# Patient Record
Sex: FEMALE | Race: OTHER | Employment: UNEMPLOYED | URBAN - METROPOLITAN AREA
[De-identification: names, ages, dates, MRNs, and addresses within clinical notes are randomized per-mention and may not be internally consistent; named-entity substitution may affect disease eponyms.]

---

## 2022-02-09 ENCOUNTER — TELEPHONE (OUTPATIENT)
Dept: OBGYN CLINIC | Facility: CLINIC | Age: 37
End: 2022-02-09

## 2022-02-09 NOTE — TELEPHONE ENCOUNTER
Patient calling to initiate prenatal care  LMP 1/17  Patient is 7-8 weeks  Confirmation Ultrasound and Appointment scheduled on   Future Appointments   Date Time Provider Katie Tang   3/16/2022  8:15 AM [facility] OB US ANN [facility] [place]   3/16/2022  8:45 AM Raven Muller MD [facility] N [facility] [place] PPO  Good time to return phone call anytime    Patient had a miscarriage in Sep.      Will have records faxed

## 2022-02-09 NOTE — TELEPHONE ENCOUNTER
Received medical records from [facility]. Placed in Dr Villar's bin in [place] for review.   Future Appointments   Date Time Provider Katie Tang   3/16/2022  8:15 AM [facility] OB US ANN [facility] [place]   3/16/2022  8:45 AM Ilia Leal MD [facility] [place]

## 2022-02-09 NOTE — TELEPHONE ENCOUNTER
LMP: 22- regular cycles, 26 days  Hx of SAB- twins    No concerns with this pregnancy. Nervous. Bleeding/ER precautions given and patient states understanding.

## 2022-02-15 NOTE — TELEPHONE ENCOUNTER
Medical records reviewed:    G1 20  at 39w4d, Male, 6lb 12oz  G2 MAB of mono/mono twins at 7wks    H/o abnormal pap - last pap 20  Ovarian torsion 21    Laparoscopy (presumed for ovarian torsion )  Suction D&C 21 - MAB of twins    Former smoker    Flu vaccine 21  COVID vaccines 3/1/21, 21  HPV vaccine

## 2022-02-26 ENCOUNTER — MED REC SCAN ONLY (OUTPATIENT)
Dept: OBGYN CLINIC | Facility: CLINIC | Age: 37
End: 2022-02-26

## 2022-03-07 ENCOUNTER — HOSPITAL ENCOUNTER (OUTPATIENT)
Facility: HOSPITAL | Age: 37
Setting detail: OBSERVATION
Discharge: HOME | End: 2022-03-08
Attending: EMERGENCY MEDICINE | Admitting: HOSPITALIST
Payer: COMMERCIAL

## 2022-03-07 ENCOUNTER — APPOINTMENT (OUTPATIENT)
Dept: ULTRASOUND IMAGING | Facility: HOSPITAL | Age: 37
End: 2022-03-07
Attending: EMERGENCY MEDICINE
Payer: COMMERCIAL

## 2022-03-07 DIAGNOSIS — R10.9 ABDOMINAL PAIN OF UNKNOWN ETIOLOGY: Primary | ICD-10-CM

## 2022-03-07 DIAGNOSIS — R11.2 NAUSEA AND VOMITING, UNSPECIFIED VOMITING TYPE: ICD-10-CM

## 2022-03-07 PROBLEM — R73.9 HYPERGLYCEMIA: Status: ACTIVE | Noted: 2022-03-07

## 2022-03-07 PROBLEM — E87.1 HYPONATREMIA: Status: ACTIVE | Noted: 2022-03-07

## 2022-03-07 PROBLEM — R79.89 AZOTEMIA: Status: ACTIVE | Noted: 2022-03-07

## 2022-03-07 PROBLEM — E87.6 HYPOKALEMIA: Status: ACTIVE | Noted: 2022-03-07

## 2022-03-07 LAB
ALBUMIN SERPL-MCNC: 4 G/DL (ref 3.4–5)
ALBUMIN/GLOB SERPL: 1.1 {RATIO} (ref 1–2)
ALP LIVER SERPL-CCNC: 60 U/L
ALT SERPL-CCNC: 20 U/L
ANION GAP SERPL CALC-SCNC: 6 MMOL/L (ref 0–18)
AST SERPL-CCNC: 9 U/L (ref 15–37)
B-HCG SERPL-ACNC: ABNORMAL MIU/ML
BASOPHILS # BLD AUTO: 0.04 X10(3) UL (ref 0–0.2)
BASOPHILS NFR BLD AUTO: 0.7 %
BILIRUB SERPL-MCNC: 0.4 MG/DL (ref 0.1–2)
BILIRUB UR QL STRIP.AUTO: NEGATIVE
BUN BLD-MCNC: 15 MG/DL (ref 7–18)
CHLORIDE SERPL-SCNC: 104 MMOL/L (ref 98–112)
CO2 SERPL-SCNC: 25 MMOL/L (ref 21–32)
COLOR UR AUTO: YELLOW
CREAT BLD-MCNC: 0.68 MG/DL
EOSINOPHIL # BLD AUTO: 0.08 X10(3) UL (ref 0–0.7)
EOSINOPHIL NFR BLD AUTO: 1.3 %
ERYTHROCYTE [DISTWIDTH] IN BLOOD BY AUTOMATED COUNT: 11.2 %
GLOBULIN PLAS-MCNC: 3.5 G/DL (ref 2.8–4.4)
GLUCOSE BLD-MCNC: 102 MG/DL (ref 70–99)
GLUCOSE UR STRIP.AUTO-MCNC: NEGATIVE MG/DL
HCT VFR BLD AUTO: 36.9 %
HGB BLD-MCNC: 13.3 G/DL
IMM GRANULOCYTES # BLD AUTO: 0.02 X10(3) UL (ref 0–1)
IMM GRANULOCYTES NFR BLD: 0.3 %
KETONES UR STRIP.AUTO-MCNC: 80 MG/DL
LEUKOCYTE ESTERASE UR QL STRIP.AUTO: NEGATIVE
LIPASE SERPL-CCNC: 66 U/L (ref 73–393)
LYMPHOCYTES # BLD AUTO: 2.17 X10(3) UL (ref 1–4)
LYMPHOCYTES NFR BLD AUTO: 36.1 %
MCH RBC QN AUTO: 31.7 PG (ref 26–34)
MCHC RBC AUTO-ENTMCNC: 36 G/DL (ref 31–37)
MCV RBC AUTO: 88.1 FL
MONOCYTES # BLD AUTO: 0.35 X10(3) UL (ref 0.1–1)
MONOCYTES NFR BLD AUTO: 5.8 %
NEUTROPHILS # BLD AUTO: 3.35 X10(3) UL (ref 1.5–7.7)
NEUTROPHILS NFR BLD AUTO: 55.8 %
NITRITE UR QL STRIP.AUTO: NEGATIVE
OSMOLALITY SERPL CALC.SUM OF ELEC: 281 MOSM/KG (ref 275–295)
PH UR STRIP.AUTO: 7 [PH] (ref 5–8)
PLATELET # BLD AUTO: 243 10(3)UL (ref 150–450)
POTASSIUM SERPL-SCNC: 3.3 MMOL/L (ref 3.5–5.1)
PROT SERPL-MCNC: 7.5 G/DL (ref 6.4–8.2)
PROT UR STRIP.AUTO-MCNC: 30 MG/DL
RBC # BLD AUTO: 4.19 X10(6)UL
RBC UR QL AUTO: NEGATIVE
RH BLOOD TYPE: POSITIVE
SARS-COV-2 RNA RESP QL NAA+PROBE: NOT DETECTED
SODIUM SERPL-SCNC: 135 MMOL/L (ref 136–145)
SP GR UR STRIP.AUTO: 1.03 (ref 1–1.03)
UROBILINOGEN UR STRIP.AUTO-MCNC: <2 MG/DL
WBC # BLD AUTO: 6 X10(3) UL (ref 4–11)

## 2022-03-07 PROCEDURE — 76856 US EXAM PELVIC COMPLETE: CPT | Performed by: EMERGENCY MEDICINE

## 2022-03-07 PROCEDURE — 76700 US EXAM ABDOM COMPLETE: CPT | Performed by: EMERGENCY MEDICINE

## 2022-03-07 PROCEDURE — 93975 VASCULAR STUDY: CPT | Performed by: EMERGENCY MEDICINE

## 2022-03-07 PROCEDURE — 76830 TRANSVAGINAL US NON-OB: CPT | Performed by: EMERGENCY MEDICINE

## 2022-03-07 RX ORDER — SODIUM CHLORIDE 9 MG/ML
INJECTION, SOLUTION INTRAVENOUS CONTINUOUS
Status: CANCELLED | OUTPATIENT
Start: 2022-03-07 | End: 2022-03-08

## 2022-03-07 RX ORDER — HYDROMORPHONE HYDROCHLORIDE 1 MG/ML
INJECTION, SOLUTION INTRAMUSCULAR; INTRAVENOUS; SUBCUTANEOUS
Status: COMPLETED
Start: 2022-03-07 | End: 2022-03-07

## 2022-03-07 RX ORDER — HYDROMORPHONE HYDROCHLORIDE 1 MG/ML
0.5 INJECTION, SOLUTION INTRAMUSCULAR; INTRAVENOUS; SUBCUTANEOUS EVERY 30 MIN PRN
Status: DISCONTINUED | OUTPATIENT
Start: 2022-03-07 | End: 2022-03-08

## 2022-03-07 RX ORDER — METOCLOPRAMIDE HYDROCHLORIDE 5 MG/ML
5 INJECTION INTRAMUSCULAR; INTRAVENOUS ONCE
Status: COMPLETED | OUTPATIENT
Start: 2022-03-07 | End: 2022-03-07

## 2022-03-07 RX ORDER — ONDANSETRON 2 MG/ML
4 INJECTION INTRAMUSCULAR; INTRAVENOUS EVERY 4 HOURS PRN
Status: CANCELLED | OUTPATIENT
Start: 2022-03-07 | End: 2022-03-08

## 2022-03-07 RX ORDER — SODIUM CHLORIDE 9 MG/ML
INJECTION, SOLUTION INTRAVENOUS CONTINUOUS
Status: DISCONTINUED | OUTPATIENT
Start: 2022-03-07 | End: 2022-03-08

## 2022-03-07 NOTE — ED INITIAL ASSESSMENT (HPI)
PT 7 weeks pregnant, c/o R lower back pain. PT describes it as sharp, not cramping. Denies vaginal bleeding. PT has hx of ovarian torsion and reports pain is similar.

## 2022-03-08 VITALS
SYSTOLIC BLOOD PRESSURE: 100 MMHG | HEART RATE: 69 BPM | RESPIRATION RATE: 18 BRPM | OXYGEN SATURATION: 100 % | TEMPERATURE: 98 F | HEIGHT: 65 IN | DIASTOLIC BLOOD PRESSURE: 58 MMHG | BODY MASS INDEX: 23.32 KG/M2 | WEIGHT: 140 LBS

## 2022-03-08 PROBLEM — R11.2 NAUSEA AND VOMITING, UNSPECIFIED VOMITING TYPE: Status: ACTIVE | Noted: 2022-03-08

## 2022-03-08 PROCEDURE — 99220 INITIAL OBSERVATION CARE,LEVL III: CPT | Performed by: HOSPITALIST

## 2022-03-08 RX ORDER — SODIUM CHLORIDE 9 MG/ML
INJECTION, SOLUTION INTRAVENOUS CONTINUOUS
Status: DISCONTINUED | OUTPATIENT
Start: 2022-03-08 | End: 2022-03-08

## 2022-03-08 RX ORDER — ACETAMINOPHEN 325 MG/1
650 TABLET ORAL EVERY 6 HOURS PRN
Status: DISCONTINUED | OUTPATIENT
Start: 2022-03-08 | End: 2022-03-08

## 2022-03-08 RX ORDER — GLYCERIN/MINERAL OIL
LOTION (ML) TOPICAL
COMMUNITY

## 2022-03-08 RX ORDER — HYDROMORPHONE HYDROCHLORIDE 1 MG/ML
0.8 INJECTION, SOLUTION INTRAMUSCULAR; INTRAVENOUS; SUBCUTANEOUS EVERY 2 HOUR PRN
Status: DISCONTINUED | OUTPATIENT
Start: 2022-03-08 | End: 2022-03-08

## 2022-03-08 RX ORDER — ONDANSETRON 2 MG/ML
4 INJECTION INTRAMUSCULAR; INTRAVENOUS EVERY 6 HOURS PRN
Status: DISCONTINUED | OUTPATIENT
Start: 2022-03-08 | End: 2022-03-08

## 2022-03-08 RX ORDER — HYDROMORPHONE HYDROCHLORIDE 1 MG/ML
0.4 INJECTION, SOLUTION INTRAMUSCULAR; INTRAVENOUS; SUBCUTANEOUS EVERY 2 HOUR PRN
Status: DISCONTINUED | OUTPATIENT
Start: 2022-03-08 | End: 2022-03-08

## 2022-03-08 RX ORDER — PROCHLORPERAZINE EDISYLATE 5 MG/ML
5 INJECTION INTRAMUSCULAR; INTRAVENOUS EVERY 8 HOURS PRN
Status: DISCONTINUED | OUTPATIENT
Start: 2022-03-08 | End: 2022-03-08

## 2022-03-08 RX ORDER — HYDROMORPHONE HYDROCHLORIDE 1 MG/ML
0.2 INJECTION, SOLUTION INTRAMUSCULAR; INTRAVENOUS; SUBCUTANEOUS EVERY 2 HOUR PRN
Status: DISCONTINUED | OUTPATIENT
Start: 2022-03-08 | End: 2022-03-08

## 2022-03-08 NOTE — PROGRESS NOTES
Patient has IV fluids infusing, on room air, voiding well, having intermittent nausea that accompanies pain, ambulates independently, tolerating a regular diet with a poor appetite. Patient's pain has greatly improved after Henrene Rides did a pelvic exam and manually lifted uterus. Possible discharge home tonight pending patient's pain. Plan of care discussed with patient.

## 2022-03-08 NOTE — PROGRESS NOTES
5403: Paged Orquidea Sosa to notify of new consult. He will see patient later. 1818: Paged Ryan Harrell (on call for Reena Desir) to inform him of patient's pain being 3/10 with no Dilaudid since 1230 and she was told by Reena Desir if pain had improved she could be discharged home tonight. Reena Desir stated patient can be discharged home. 1845: paged  to inquire about discharge orders.  Orders placed by .

## 2022-03-08 NOTE — PLAN OF CARE
Problem: Patient/Family Goals  Goal: Patient/Family Long Term Goal  Description: Patient's Long Term Goal: discharge home    Interventions:  - manage pain  - IVF  - See additional Care Plan goals for specific interventions  Outcome: Progressing  Goal: Patient/Family Short Term Goal  Description: Patient's Short Term Goal: pain management    Interventions:   - PRN pain meds  - PRN antiemetics  - See additional Care Plan goals for specific interventions  Outcome: Progressing     Problem: PAIN - ADULT  Goal: Verbalizes/displays adequate comfort level or patient's stated pain goal  Description: INTERVENTIONS:  - Encourage pt to monitor pain and request assistance  - Assess pain using appropriate pain scale  - Administer analgesics based on type and severity of pain and evaluate response  - Implement non-pharmacological measures as appropriate and evaluate response  - Consider cultural and social influences on pain and pain management  - Manage/alleviate anxiety  - Utilize distraction and/or relaxation techniques  - Monitor for opioid side effects  - Notify MD/LIP if interventions unsuccessful or patient reports new pain  - Anticipate increased pain with activity and pre-medicate as appropriate  Outcome: Progressing     Problem: SAFETY ADULT - FALL  Goal: Free from fall injury  Description: INTERVENTIONS:  - Assess pt frequently for physical needs  - Identify cognitive and physical deficits and behaviors that affect risk of falls.   - [place] fall precautions as indicated by assessment.  - Educate pt/family on patient safety including physical limitations  - Instruct pt to call for assistance with activity based on assessment  - Modify environment to reduce risk of injury  - Provide assistive devices as appropriate  - Consider OT/PT consult to assist with strengthening/mobility  - Encourage toileting schedule  Outcome: Progressing     Problem: DISCHARGE PLANNING  Goal: Discharge to home or other facility with appropriate resources  Description: INTERVENTIONS:  - Identify barriers to discharge w/pt and caregiver  - Include patient/family/discharge partner in discharge planning  - Arrange for needed discharge resources and transportation as appropriate  - Identify discharge learning needs (meds, wound care, etc)  - Arrange for interpreters to assist at discharge as needed  - Consider post-discharge preferences of patient/family/discharge partner  - Complete POLST form as appropriate  - Assess patient's ability to be responsible for managing their own health  - Refer to Case Management Department for coordinating discharge planning if the patient needs post-hospital services based on physician/LIP order or complex needs related to functional status, cognitive ability or social support system  Outcome: Progressing

## 2022-03-08 NOTE — PLAN OF CARE
A&Ox4. VSS. RA. . Resp: Clear bilaterally  GI: Abdomen soft, slightly rounded. Active bowel sounds, pt not passing gas. States feeling very constipated. : Voids. Pain controlled with PRN pain medications  Up ad jocy  Diet: regular  Denies nausea. IVF running per order. All appropriate safety measures in place. All questions and concerns addressed. Will continue to monitor     0300: Pt in severe pain that has moved to the [facility]. Problem: Patient/Family Goals  Goal: Patient/Family Long Term Goal  Description: Patient's Long Term Goal: discharge home    Interventions:  - manage pain  - IVF  - See additional Care Plan goals for specific interventions  Outcome: Progressing  Goal: Patient/Family Short Term Goal  Description: Patient's Short Term Goal: pain management    Interventions:   - PRN pain meds  - PRN antiemetics  - See additional Care Plan goals for specific interventions  Outcome: Progressing     Problem: PAIN - ADULT  Goal: Verbalizes/displays adequate comfort level or patient's stated pain goal  Description: INTERVENTIONS:  - Encourage pt to monitor pain and request assistance  - Assess pain using appropriate pain scale  - Administer analgesics based on type and severity of pain and evaluate response  - Implement non-pharmacological measures as appropriate and evaluate response  - Consider cultural and social influences on pain and pain management  - Manage/alleviate anxiety  - Utilize distraction and/or relaxation techniques  - Monitor for opioid side effects  - Notify MD/LIP if interventions unsuccessful or patient reports new pain  - Anticipate increased pain with activity and pre-medicate as appropriate  Outcome: Progressing     Problem: SAFETY ADULT - FALL  Goal: Free from fall injury  Description: INTERVENTIONS:  - Assess pt frequently for physical needs  - Identify cognitive and physical deficits and behaviors that affect risk of falls.   - [place] fall precautions as indicated by assessment.  - Educate pt/family on patient safety including physical limitations  - Instruct pt to call for assistance with activity based on assessment  - Modify environment to reduce risk of injury  - Provide assistive devices as appropriate  - Consider OT/PT consult to assist with strengthening/mobility  - Encourage toileting schedule  Outcome: Progressing     Problem: DISCHARGE PLANNING  Goal: Discharge to home or other facility with appropriate resources  Description: INTERVENTIONS:  - Identify barriers to discharge w/pt and caregiver  - Include patient/family/discharge partner in discharge planning  - Arrange for needed discharge resources and transportation as appropriate  - Identify discharge learning needs (meds, wound care, etc)  - Arrange for interpreters to assist at discharge as needed  - Consider post-discharge preferences of patient/family/discharge partner  - Complete POLST form as appropriate  - Assess patient's ability to be responsible for managing their own health  - Refer to Case Management Department for coordinating discharge planning if the patient needs post-hospital services based on physician/LIP order or complex needs related to functional status, cognitive ability or social support system  Outcome: Progressing

## 2022-03-09 NOTE — PROGRESS NOTES
NURSING DISCHARGE NOTE    Discharged Home via Wheelchair. Accompanied by Spouse  Belongings Taken by patient/family. Patient discharged home in stable condition. Patient left the floor via wheelchair and was accompanied by her . Discharge instructions given and were explained in detail. No new prescriptions-instructed patient to take OTC Tylenol and if pain not tolerable to call 's office. Instructed patient to schedule a follow up appointment with Steph Bowden or his partner by Friday. Patient stated understanding of discharge instructions and had no further questions at this time. Saline lock discontinued.

## 2022-03-26 ENCOUNTER — HOSPITAL ENCOUNTER (OUTPATIENT)
Facility: HOSPITAL | Age: 37
Setting detail: OBSERVATION
Discharge: HOME | End: 2022-03-27
Attending: EMERGENCY MEDICINE | Admitting: OBSTETRICS & GYNECOLOGY
Payer: COMMERCIAL

## 2022-03-26 ENCOUNTER — APPOINTMENT (OUTPATIENT)
Dept: ULTRASOUND IMAGING | Facility: HOSPITAL | Age: 37
End: 2022-03-26
Attending: EMERGENCY MEDICINE
Payer: COMMERCIAL

## 2022-03-26 DIAGNOSIS — R10.2 RIGHT ADNEXAL TENDERNESS: ICD-10-CM

## 2022-03-26 DIAGNOSIS — N83.6 HEMATOSALPINX: Primary | ICD-10-CM

## 2022-03-26 LAB
ALBUMIN SERPL-MCNC: 3.7 G/DL (ref 3.4–5)
ALBUMIN/GLOB SERPL: 1.1 {RATIO} (ref 1–2)
ALP LIVER SERPL-CCNC: 52 U/L
ALT SERPL-CCNC: 16 U/L
ANION GAP SERPL CALC-SCNC: 5 MMOL/L (ref 0–18)
AST SERPL-CCNC: 14 U/L (ref 15–37)
B-HCG SERPL-ACNC: ABNORMAL MIU/ML
B-HCG UR QL: POSITIVE
BASOPHILS # BLD AUTO: 0.03 X10(3) UL (ref 0–0.2)
BASOPHILS NFR BLD AUTO: 0.5 %
BILIRUB SERPL-MCNC: 0.5 MG/DL (ref 0.1–2)
BILIRUB UR QL STRIP.AUTO: NEGATIVE
BUN BLD-MCNC: 10 MG/DL (ref 7–18)
CALCIUM BLD-MCNC: 8.9 MG/DL (ref 8.5–10.1)
CHLORIDE SERPL-SCNC: 106 MMOL/L (ref 98–112)
CO2 SERPL-SCNC: 25 MMOL/L (ref 21–32)
COLOR UR AUTO: YELLOW
CREAT BLD-MCNC: 0.61 MG/DL
EOSINOPHIL # BLD AUTO: 0.02 X10(3) UL (ref 0–0.7)
EOSINOPHIL NFR BLD AUTO: 0.3 %
ERYTHROCYTE [DISTWIDTH] IN BLOOD BY AUTOMATED COUNT: 11.7 %
GLOBULIN PLAS-MCNC: 3.5 G/DL (ref 2.8–4.4)
GLUCOSE BLD-MCNC: 95 MG/DL (ref 70–99)
GLUCOSE UR STRIP.AUTO-MCNC: NEGATIVE MG/DL
HCT VFR BLD AUTO: 37.4 %
HGB BLD-MCNC: 13.6 G/DL
IMM GRANULOCYTES # BLD AUTO: 0.03 X10(3) UL (ref 0–1)
IMM GRANULOCYTES NFR BLD: 0.5 %
KETONES UR STRIP.AUTO-MCNC: NEGATIVE MG/DL
LEUKOCYTE ESTERASE UR QL STRIP.AUTO: NEGATIVE
LYMPHOCYTES # BLD AUTO: 1.2 X10(3) UL (ref 1–4)
LYMPHOCYTES NFR BLD AUTO: 19.6 %
MCH RBC QN AUTO: 31.9 PG (ref 26–34)
MCHC RBC AUTO-ENTMCNC: 36.4 G/DL (ref 31–37)
MCV RBC AUTO: 87.8 FL
MONOCYTES # BLD AUTO: 0.25 X10(3) UL (ref 0.1–1)
MONOCYTES NFR BLD AUTO: 4.1 %
NEUTROPHILS # BLD AUTO: 4.6 X10 (3) UL (ref 1.5–7.7)
NEUTROPHILS # BLD AUTO: 4.6 X10(3) UL (ref 1.5–7.7)
NEUTROPHILS NFR BLD AUTO: 75 %
NITRITE UR QL STRIP.AUTO: NEGATIVE
OSMOLALITY SERPL CALC.SUM OF ELEC: 281 MOSM/KG (ref 275–295)
PH UR STRIP.AUTO: 7 [PH] (ref 5–8)
PLATELET # BLD AUTO: 220 10(3)UL (ref 150–450)
POTASSIUM SERPL-SCNC: 3.6 MMOL/L (ref 3.5–5.1)
PROT SERPL-MCNC: 7.2 G/DL (ref 6.4–8.2)
PROT UR STRIP.AUTO-MCNC: 30 MG/DL
RBC # BLD AUTO: 4.26 X10(6)UL
RBC UR QL AUTO: NEGATIVE
SARS-COV-2 RNA RESP QL NAA+PROBE: NOT DETECTED
SODIUM SERPL-SCNC: 136 MMOL/L (ref 136–145)
SP GR UR STRIP.AUTO: 1.03 (ref 1–1.03)
UROBILINOGEN UR STRIP.AUTO-MCNC: <2 MG/DL
WBC # BLD AUTO: 6.1 X10(3) UL (ref 4–11)

## 2022-03-26 PROCEDURE — 76817 TRANSVAGINAL US OBSTETRIC: CPT | Performed by: EMERGENCY MEDICINE

## 2022-03-26 PROCEDURE — 81025 URINE PREGNANCY TEST: CPT

## 2022-03-26 PROCEDURE — 96361 HYDRATE IV INFUSION ADD-ON: CPT | Performed by: EMERGENCY MEDICINE

## 2022-03-26 PROCEDURE — 85025 COMPLETE CBC W/AUTO DIFF WBC: CPT | Performed by: EMERGENCY MEDICINE

## 2022-03-26 PROCEDURE — 99285 EMERGENCY DEPT VISIT HI MDM: CPT | Performed by: EMERGENCY MEDICINE

## 2022-03-26 PROCEDURE — 96375 TX/PRO/DX INJ NEW DRUG ADDON: CPT | Performed by: EMERGENCY MEDICINE

## 2022-03-26 PROCEDURE — 81001 URINALYSIS AUTO W/SCOPE: CPT | Performed by: EMERGENCY MEDICINE

## 2022-03-26 PROCEDURE — 96374 THER/PROPH/DIAG INJ IV PUSH: CPT | Performed by: EMERGENCY MEDICINE

## 2022-03-26 PROCEDURE — 84702 CHORIONIC GONADOTROPIN TEST: CPT | Performed by: EMERGENCY MEDICINE

## 2022-03-26 PROCEDURE — 76801 OB US < 14 WKS SINGLE FETUS: CPT | Performed by: EMERGENCY MEDICINE

## 2022-03-26 PROCEDURE — 80053 COMPREHEN METABOLIC PANEL: CPT | Performed by: EMERGENCY MEDICINE

## 2022-03-26 PROCEDURE — 96361 HYDRATE IV INFUSION ADD-ON: CPT

## 2022-03-26 PROCEDURE — 76775 US EXAM ABDO BACK WALL LIM: CPT | Performed by: EMERGENCY MEDICINE

## 2022-03-26 PROCEDURE — 81001 URINALYSIS AUTO W/SCOPE: CPT

## 2022-03-26 PROCEDURE — 99285 EMERGENCY DEPT VISIT HI MDM: CPT

## 2022-03-26 RX ORDER — MORPHINE SULFATE 2 MG/ML
2 INJECTION, SOLUTION INTRAMUSCULAR; INTRAVENOUS ONCE
Status: COMPLETED | OUTPATIENT
Start: 2022-03-26 | End: 2022-03-26

## 2022-03-26 RX ORDER — ACETAMINOPHEN 500 MG
1000 TABLET ORAL ONCE
Status: COMPLETED | OUTPATIENT
Start: 2022-03-26 | End: 2022-03-26

## 2022-03-26 RX ORDER — MORPHINE SULFATE 2 MG/ML
2 INJECTION, SOLUTION INTRAMUSCULAR; INTRAVENOUS ONCE
Status: DISCONTINUED | OUTPATIENT
Start: 2022-03-26 | End: 2022-03-27

## 2022-03-26 RX ORDER — ONDANSETRON 2 MG/ML
4 INJECTION INTRAMUSCULAR; INTRAVENOUS EVERY 6 HOURS PRN
Status: DISCONTINUED | OUTPATIENT
Start: 2022-03-26 | End: 2022-03-27

## 2022-03-26 RX ORDER — DEXTROSE AND SODIUM CHLORIDE 5; .45 G/100ML; G/100ML
INJECTION, SOLUTION INTRAVENOUS CONTINUOUS
Status: DISCONTINUED | OUTPATIENT
Start: 2022-03-26 | End: 2022-03-26

## 2022-03-26 RX ORDER — SODIUM CHLORIDE 9 MG/ML
1000 INJECTION, SOLUTION INTRAVENOUS ONCE
Status: COMPLETED | OUTPATIENT
Start: 2022-03-26 | End: 2022-03-27

## 2022-03-26 RX ORDER — DEXTROSE AND SODIUM CHLORIDE 5; .45 G/100ML; G/100ML
INJECTION, SOLUTION INTRAVENOUS CONTINUOUS
Status: DISCONTINUED | OUTPATIENT
Start: 2022-03-27 | End: 2022-03-27

## 2022-03-26 RX ORDER — ONDANSETRON 2 MG/ML
4 INJECTION INTRAMUSCULAR; INTRAVENOUS ONCE
Status: COMPLETED | OUTPATIENT
Start: 2022-03-26 | End: 2022-03-26

## 2022-03-26 RX ORDER — ACETAMINOPHEN 500 MG
1000 TABLET ORAL EVERY 4 HOURS PRN
Status: DISCONTINUED | OUTPATIENT
Start: 2022-03-26 | End: 2022-03-27

## 2022-03-26 RX ORDER — DIPHENHYDRAMINE HYDROCHLORIDE 50 MG/ML
25 INJECTION INTRAMUSCULAR; INTRAVENOUS ONCE
Status: COMPLETED | OUTPATIENT
Start: 2022-03-26 | End: 2022-03-26

## 2022-03-26 RX ORDER — SODIUM CHLORIDE 9 MG/ML
INJECTION, SOLUTION INTRAVENOUS CONTINUOUS
Status: DISCONTINUED | OUTPATIENT
Start: 2022-03-26 | End: 2022-03-26

## 2022-03-26 NOTE — ED QUICK NOTES
Pt reevaluated by er physician. Pt Informed of all her test reports and plan of care.  Pt verbalizing understanding

## 2022-03-26 NOTE — ED PROVIDER NOTES
Received signout from Dr. Jazmin Duncan. 82-year-old female at 10 weeks gestation here with right pelvic pain and emesis. History of right ovarian torsion. Ultrasound showing fluid collection in the right adnexal region concerning for hematosalpinx. Upon my evaluation patient's pain has substantially improved and she has no adnexal tenderness to exam.  Discussed with OB, will admit for observation and care    US KIDNEYS (LQM=[id number])    Result Date: 3/26/2022  CONCLUSION:  No acute findings. Dictated by (CST): Meet Leija MD on 3/26/2022 at 7:47 AM     Finalized by (CST): Meet Leija MD on 3/26/2022 at 7:48 AM       US PREGNANCY LESS THAN 14 WEEKS (TRANSABDOMINAL/TRANSVAGINAL) ([id number]=[id number])    Result Date: 3/26/2022  CONCLUSION: 1. Single live intrauterine pregnancy with mean gestational age by ultrasound at 10 weeks,  2 days +/- 5 days with expected delivery date of 10/20/2022 . 2. Complex hypoechoic tubular structure adjacent to the right ovary in the right adnexa measuring 5.9 x 2.2 x 5.9 cm. This is indeterminate. This could represent hematosalpinx, adjacent bowel but pyosalpinx cannot be completely excluded. Clinical correlation and repeat evaluation on follow-up ultrasound is recommended. This region was not well identified on prior examination. 3. Small amount of free pelvic fluid.       Dictated by (CST): Meet Leija MD on 3/26/2022 at 7:34 AM     Finalized by (CST): Meet Leija MD on 3/26/2022 at 7:39 AM

## 2022-03-26 NOTE — ED QUICK NOTES
Orders for admission, patient is aware of plan and ready to go upstairs.  Any questions, please call ED RN Nrey Lima at extension [phone]    Patient Covid vaccination status: Unvaccinated     COVID Test Ordered in ED: None    COVID Suspicion at Admission: N/A    Running Infusions:  Saline 1 liiter 125cc/hr    Mental Status/LOC at time of transport:  aox4    Other pertinent information:   CIWA score: N/A   NIH score:  N/A

## 2022-03-26 NOTE — ED INITIAL ASSESSMENT (HPI)
R side lower back pain started at 0100 awoke pt from sleep. Non radiating sharp 5/10 had taken   Gas x and Tylenol at home with no improvement.    Denies any fevers or v/d  Or urinary s/s

## 2022-03-26 NOTE — ED QUICK NOTES
Spoke with OR rn, states no scheduled procedure listed for the patient, send pt to the floor assigned

## 2022-03-26 NOTE — PROGRESS NOTES
Pt admitted from ER, no plans for surgery at this time. A&O x's 4, pt is 34 weeks pregnant. Per Dr. Gely Winn, pt to stay NPO until evening and if pain is gone, may possibly start diet then. IVF as ordered. Okay to be up as tolerated. PRN tylenol for pain; if pain increases severely,  Is to be notified. Orders for repeat US tomorrow morning. Pt aware and agreeable to plan. Call light and belongings are within reach, will continue hourly and prn safety rounds.

## 2022-03-27 ENCOUNTER — APPOINTMENT (OUTPATIENT)
Dept: ULTRASOUND IMAGING | Facility: HOSPITAL | Age: 37
End: 2022-03-27
Attending: OBSTETRICS & GYNECOLOGY
Payer: COMMERCIAL

## 2022-03-27 VITALS
DIASTOLIC BLOOD PRESSURE: 51 MMHG | OXYGEN SATURATION: 99 % | TEMPERATURE: 98 F | RESPIRATION RATE: 16 BRPM | SYSTOLIC BLOOD PRESSURE: 91 MMHG | HEART RATE: 66 BPM

## 2022-03-27 PROCEDURE — 76856 US EXAM PELVIC COMPLETE: CPT | Performed by: OBSTETRICS & GYNECOLOGY

## 2022-03-27 PROCEDURE — 76830 TRANSVAGINAL US NON-OB: CPT | Performed by: OBSTETRICS & GYNECOLOGY

## 2022-03-27 PROCEDURE — 96361 HYDRATE IV INFUSION ADD-ON: CPT

## 2022-03-27 NOTE — PLAN OF CARE
Pt a&ox4. Up independently with steady gait. Voiding without difficulty. Pt reporting very mild back/pelvic pain but states it is much improved from earlier today. Declines the need for pain medications. Instructed to notify RN if pain increases. Tolerating diet tonight. Plan for NPO after midnight and repeat US in the morning. Safey precautions in place. Plan of care discussed with pt, pt verbalizes understanding.         Problem: Patient/Family Goals  Goal: Patient/Family Long Term Goal  Description: Patient's Long Term Goal: discharge home    Interventions:  - IVF as ordered  -pelvic ultrasound  -maintain comfort  - See additional Care Plan goals for specific interventions  Outcome: Progressing  Goal: Patient/Family Short Term Goal  Description: Patient's Short Term Goal: comfort    Interventions:   - tolerate diet  -tolerate activity    - See additional Care Plan goals for specific interventions  Outcome: Progressing     Problem: PAIN - ADULT  Goal: Verbalizes/displays adequate comfort level or patient's stated pain goal  Description: INTERVENTIONS:  - Encourage pt to monitor pain and request assistance  - Assess pain using appropriate pain scale  - Administer analgesics based on type and severity of pain and evaluate response  - Implement non-pharmacological measures as appropriate and evaluate response  - Consider cultural and social influences on pain and pain management  - Manage/alleviate anxiety  - Utilize distraction and/or relaxation techniques  - Monitor for opioid side effects  - Notify MD/LIP if interventions unsuccessful or patient reports new pain  - Anticipate increased pain with activity and pre-medicate as appropriate  Outcome: Progressing     Problem: RISK FOR INFECTION - ADULT  Goal: Absence of fever/infection during anticipated neutropenic period  Description: INTERVENTIONS  - Monitor WBC  - Administer growth factors as ordered  - Implement neutropenic guidelines  Outcome: Progressing Problem: SAFETY ADULT - FALL  Goal: Free from fall injury  Description: INTERVENTIONS:  - Assess pt frequently for physical needs  - Identify cognitive and physical deficits and behaviors that affect risk of falls.   - [place] fall precautions as indicated by assessment.  - Educate pt/family on patient safety including physical limitations  - Instruct pt to call for assistance with activity based on assessment  - Modify environment to reduce risk of injury  - Provide assistive devices as appropriate  - Consider OT/PT consult to assist with strengthening/mobility  - Encourage toileting schedule  Outcome: Progressing     Problem: DISCHARGE PLANNING  Goal: Discharge to home or other facility with appropriate resources  Description: INTERVENTIONS:  - Identify barriers to discharge w/pt and caregiver  - Include patient/family/discharge partner in discharge planning  - Arrange for needed discharge resources and transportation as appropriate  - Identify discharge learning needs (meds, wound care, etc)  - Arrange for interpreters to assist at discharge as needed  - Consider post-discharge preferences of patient/family/discharge partner  - Complete POLST form as appropriate  - Assess patient's ability to be responsible for managing their own health  - Refer to Case Management Department for coordinating discharge planning if the patient needs post-hospital services based on physician/LIP order or complex needs related to functional status, cognitive ability or social support system  Outcome: Progressing

## 2022-03-27 NOTE — PLAN OF CARE
Pt discharged home in stable condition after pelvic US and being seen by OB. Pt had breakfast prior to discharge, tolerated meal well, no complaints of nausea or vomiting. No complaints of pain either. Pt already has scheduled follow up appt with OB for this upcoming Tuesday. Pt aware of home medications to resume. Pt had no questions/concerns on her discharge.     Problem: Patient/Family Goals  Goal: Patient/Family Long Term Goal  Description: Patient's Long Term Goal: discharge home    Interventions:  - IVF as ordered  -pelvic ultrasound  -maintain comfort  - See additional Care Plan goals for specific interventions  Outcome: Progressing  Goal: Patient/Family Short Term Goal  Description: Patient's Short Term Goal: comfort    Interventions:   - tolerate diet  -tolerate activity    - See additional Care Plan goals for specific interventions  Outcome: Progressing     Problem: PAIN - ADULT  Goal: Verbalizes/displays adequate comfort level or patient's stated pain goal  Description: INTERVENTIONS:  - Encourage pt to monitor pain and request assistance  - Assess pain using appropriate pain scale  - Administer analgesics based on type and severity of pain and evaluate response  - Implement non-pharmacological measures as appropriate and evaluate response  - Consider cultural and social influences on pain and pain management  - Manage/alleviate anxiety  - Utilize distraction and/or relaxation techniques  - Monitor for opioid side effects  - Notify MD/LIP if interventions unsuccessful or patient reports new pain  - Anticipate increased pain with activity and pre-medicate as appropriate  Outcome: Progressing     Problem: RISK FOR INFECTION - ADULT  Goal: Absence of fever/infection during anticipated neutropenic period  Description: INTERVENTIONS  - Monitor WBC  - Administer growth factors as ordered  - Implement neutropenic guidelines  Outcome: Progressing     Problem: SAFETY ADULT - FALL  Goal: Free from fall injury  Description: INTERVENTIONS:  - Assess pt frequently for physical needs  - Identify cognitive and physical deficits and behaviors that affect risk of falls.   - [place] fall precautions as indicated by assessment.  - Educate pt/family on patient safety including physical limitations  - Instruct pt to call for assistance with activity based on assessment  - Modify environment to reduce risk of injury  - Provide assistive devices as appropriate  - Consider OT/PT consult to assist with strengthening/mobility  - Encourage toileting schedule  Outcome: Progressing     Problem: DISCHARGE PLANNING  Goal: Discharge to home or other facility with appropriate resources  Description: INTERVENTIONS:  - Identify barriers to discharge w/pt and caregiver  - Include patient/family/discharge partner in discharge planning  - Arrange for needed discharge resources and transportation as appropriate  - Identify discharge learning needs (meds, wound care, etc)  - Arrange for interpreters to assist at discharge as needed  - Consider post-discharge preferences of patient/family/discharge partner  - Complete POLST form as appropriate  - Assess patient's ability to be responsible for managing their own health  - Refer to Case Management Department for coordinating discharge planning if the patient needs post-hospital services based on physician/LIP order or complex needs related to functional status, cognitive ability or social support system  Outcome: Progressing

## 2022-03-29 PROBLEM — O09.521 AMA (ADVANCED MATERNAL AGE) MULTIGRAVIDA 35+, FIRST TRIMESTER (HCC): Status: ACTIVE | Noted: 2022-03-29

## 2022-03-29 PROBLEM — O34.511: Status: ACTIVE | Noted: 2022-03-29

## 2022-03-29 PROBLEM — O09.521 AMA (ADVANCED MATERNAL AGE) MULTIGRAVIDA 35+, FIRST TRIMESTER: Status: ACTIVE | Noted: 2022-03-29

## 2022-03-29 PROBLEM — N94.89 ADNEXAL MASS: Status: ACTIVE | Noted: 2022-03-29

## 2022-03-29 PROCEDURE — 87086 URINE CULTURE/COLONY COUNT: CPT | Performed by: OBSTETRICS & GYNECOLOGY

## 2022-03-29 PROCEDURE — 87624 HPV HI-RISK TYP POOLED RSLT: CPT | Performed by: OBSTETRICS & GYNECOLOGY

## 2022-03-29 PROCEDURE — 87591 N.GONORRHOEAE DNA AMP PROB: CPT | Performed by: OBSTETRICS & GYNECOLOGY

## 2022-03-29 PROCEDURE — 87491 CHLMYD TRACH DNA AMP PROBE: CPT | Performed by: OBSTETRICS & GYNECOLOGY

## 2022-04-02 ENCOUNTER — LAB ENCOUNTER (OUTPATIENT)
Dept: LAB | Facility: HOSPITAL | Age: 37
End: 2022-04-02
Attending: OBSTETRICS & GYNECOLOGY
Payer: COMMERCIAL

## 2022-04-02 DIAGNOSIS — O09.521 AMA (ADVANCED MATERNAL AGE) MULTIGRAVIDA 35+, FIRST TRIMESTER: ICD-10-CM

## 2022-04-02 LAB
ANTIBODY SCREEN: NEGATIVE
BASOPHILS # BLD AUTO: 0.04 X10(3) UL (ref 0–0.2)
BASOPHILS NFR BLD AUTO: 0.7 %
EOSINOPHIL # BLD AUTO: 0.08 X10(3) UL (ref 0–0.7)
EOSINOPHIL NFR BLD AUTO: 1.3 %
ERYTHROCYTE [DISTWIDTH] IN BLOOD BY AUTOMATED COUNT: 11.6 %
HBV SURFACE AG SER-ACNC: <0.1 [IU]/L
HBV SURFACE AG SERPL QL IA: NONREACTIVE
HCT VFR BLD AUTO: 37.8 %
HGB BLD-MCNC: 12.9 G/DL
IMM GRANULOCYTES # BLD AUTO: 0.04 X10(3) UL (ref 0–1)
IMM GRANULOCYTES NFR BLD: 0.7 %
LYMPHOCYTES # BLD AUTO: 2.25 X10(3) UL (ref 1–4)
LYMPHOCYTES NFR BLD AUTO: 37.6 %
MCH RBC QN AUTO: 30.7 PG (ref 26–34)
MCHC RBC AUTO-ENTMCNC: 34.1 G/DL (ref 31–37)
MCV RBC AUTO: 90 FL
MONOCYTES # BLD AUTO: 0.4 X10(3) UL (ref 0.1–1)
MONOCYTES NFR BLD AUTO: 6.7 %
NEUTROPHILS # BLD AUTO: 3.18 X10 (3) UL (ref 1.5–7.7)
NEUTROPHILS # BLD AUTO: 3.18 X10(3) UL (ref 1.5–7.7)
NEUTROPHILS NFR BLD AUTO: 53 %
PLATELET # BLD AUTO: 236 10(3)UL (ref 150–450)
RBC # BLD AUTO: 4.2 X10(6)UL
RH BLOOD TYPE: POSITIVE
RUBV IGG SER QL: POSITIVE
RUBV IGG SER-ACNC: 148.8 IU/ML (ref 10–?)
T PALLIDUM AB SER QL IA: NONREACTIVE
WBC # BLD AUTO: 6 X10(3) UL (ref 4–11)

## 2022-04-02 PROCEDURE — 87389 HIV-1 AG W/HIV-1&-2 AB AG IA: CPT

## 2022-04-02 PROCEDURE — 86762 RUBELLA ANTIBODY: CPT

## 2022-04-02 PROCEDURE — 36415 COLL VENOUS BLD VENIPUNCTURE: CPT

## 2022-04-02 PROCEDURE — 86850 RBC ANTIBODY SCREEN: CPT

## 2022-04-02 PROCEDURE — 87340 HEPATITIS B SURFACE AG IA: CPT

## 2022-04-02 PROCEDURE — 86780 TREPONEMA PALLIDUM: CPT

## 2022-04-02 PROCEDURE — 86901 BLOOD TYPING SEROLOGIC RH(D): CPT

## 2022-04-02 PROCEDURE — 86900 BLOOD TYPING SEROLOGIC ABO: CPT

## 2022-04-02 PROCEDURE — 85025 COMPLETE CBC W/AUTO DIFF WBC: CPT

## 2022-05-24 PROBLEM — R10.9 ABDOMINAL PAIN OF UNKNOWN ETIOLOGY: Status: RESOLVED | Noted: 2022-03-07 | Resolved: 2022-05-24

## 2022-05-24 PROBLEM — R10.2 RIGHT ADNEXAL TENDERNESS: Status: RESOLVED | Noted: 2022-03-26 | Resolved: 2022-05-24

## 2022-05-28 ENCOUNTER — LAB ENCOUNTER (OUTPATIENT)
Dept: LAB | Facility: HOSPITAL | Age: 37
End: 2022-05-28
Attending: OBSTETRICS & GYNECOLOGY
Payer: COMMERCIAL

## 2022-05-28 DIAGNOSIS — O09.522 AMA (ADVANCED MATERNAL AGE) MULTIGRAVIDA 35+, SECOND TRIMESTER: ICD-10-CM

## 2022-05-28 PROCEDURE — 36415 COLL VENOUS BLD VENIPUNCTURE: CPT

## 2022-05-28 PROCEDURE — 82105 ALPHA-FETOPROTEIN SERUM: CPT

## 2022-05-31 LAB
AFP SMOKING: NO
FAMILY HX NEURAL TUBE DEFECT: NO
INSULIN REQ MATERNAL DIABETES: NO
MATERNAL AGE OF DELIVERY: 35.1 YR
MOM FOR AFP: 1.57
PATIENT'S AFP: 78 NG/ML

## 2022-06-07 ENCOUNTER — HOSPITAL ENCOUNTER (OUTPATIENT)
Facility: HOSPITAL | Age: 37
Discharge: HOME | End: 2022-06-07
Attending: OBSTETRICS & GYNECOLOGY | Admitting: OBSTETRICS & GYNECOLOGY
Payer: COMMERCIAL

## 2022-06-07 VITALS — HEIGHT: 65 IN | WEIGHT: 150 LBS | BODY MASS INDEX: 24.99 KG/M2

## 2022-06-07 PROCEDURE — 99211 OFF/OP EST MAY X REQ PHY/QHP: CPT

## 2022-06-07 NOTE — PROGRESS NOTES
Discharged to home per ambulatory in stable condition with written and verbal instructions. Patient verbalizes understanding of this information.
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
## 2022-06-26 ENCOUNTER — APPOINTMENT (OUTPATIENT)
Dept: ULTRASOUND IMAGING | Facility: HOSPITAL | Age: 37
End: 2022-06-26
Attending: EMERGENCY MEDICINE
Payer: COMMERCIAL

## 2022-06-26 ENCOUNTER — HOSPITAL ENCOUNTER (EMERGENCY)
Facility: HOSPITAL | Age: 37
Discharge: HOME | End: 2022-06-26
Attending: EMERGENCY MEDICINE
Payer: COMMERCIAL

## 2022-06-26 VITALS
RESPIRATION RATE: 16 BRPM | SYSTOLIC BLOOD PRESSURE: 109 MMHG | DIASTOLIC BLOOD PRESSURE: 69 MMHG | OXYGEN SATURATION: 100 % | TEMPERATURE: 98 F | HEART RATE: 68 BPM

## 2022-06-26 DIAGNOSIS — M79.662 PAIN OF LEFT CALF: Primary | ICD-10-CM

## 2022-06-26 DIAGNOSIS — Z3A.23 23 WEEKS GESTATION OF PREGNANCY: ICD-10-CM

## 2022-06-26 PROCEDURE — 99284 EMERGENCY DEPT VISIT MOD MDM: CPT

## 2022-06-26 PROCEDURE — 93971 EXTREMITY STUDY: CPT | Performed by: EMERGENCY MEDICINE

## 2022-06-26 NOTE — ED INITIAL ASSESSMENT (HPI)
Pt to ED c/o left calf and foot pain after being on an airplane today. Pt is 23 weeks pregnant. Pt denies shortness of breath.

## 2022-07-08 ENCOUNTER — LAB ENCOUNTER (OUTPATIENT)
Dept: LAB | Facility: HOSPITAL | Age: 37
End: 2022-07-08
Payer: COMMERCIAL

## 2022-07-08 DIAGNOSIS — Z34.82 ENCOUNTER FOR SUPERVISION OF OTHER NORMAL PREGNANCY IN SECOND TRIMESTER: ICD-10-CM

## 2022-07-08 LAB
BASOPHILS # BLD AUTO: 0.04 X10(3) UL (ref 0–0.2)
BASOPHILS NFR BLD AUTO: 0.4 %
EOSINOPHIL # BLD AUTO: 0.1 X10(3) UL (ref 0–0.7)
EOSINOPHIL NFR BLD AUTO: 0.9 %
ERYTHROCYTE [DISTWIDTH] IN BLOOD BY AUTOMATED COUNT: 12.1 %
HCT VFR BLD AUTO: 36.8 %
HGB BLD-MCNC: 12.6 G/DL
IMM GRANULOCYTES # BLD AUTO: 0.15 X10(3) UL (ref 0–1)
IMM GRANULOCYTES NFR BLD: 1.4 %
LYMPHOCYTES # BLD AUTO: 1.24 X10(3) UL (ref 1–4)
LYMPHOCYTES NFR BLD AUTO: 11.8 %
MCH RBC QN AUTO: 31.8 PG (ref 26–34)
MCHC RBC AUTO-ENTMCNC: 34.2 G/DL (ref 31–37)
MCV RBC AUTO: 92.9 FL
MONOCYTES # BLD AUTO: 0.59 X10(3) UL (ref 0.1–1)
MONOCYTES NFR BLD AUTO: 5.6 %
NEUTROPHILS # BLD AUTO: 8.41 X10 (3) UL (ref 1.5–7.7)
NEUTROPHILS # BLD AUTO: 8.41 X10(3) UL (ref 1.5–7.7)
NEUTROPHILS NFR BLD AUTO: 79.9 %
PLATELET # BLD AUTO: 226 10(3)UL (ref 150–450)
RBC # BLD AUTO: 3.96 X10(6)UL
WBC # BLD AUTO: 10.5 X10(3) UL (ref 4–11)

## 2022-07-08 PROCEDURE — 85025 COMPLETE CBC W/AUTO DIFF WBC: CPT

## 2022-07-09 ENCOUNTER — LAB ENCOUNTER (OUTPATIENT)
Dept: LAB | Facility: HOSPITAL | Age: 37
End: 2022-07-09
Payer: COMMERCIAL

## 2022-07-09 PROCEDURE — 82950 GLUCOSE TEST: CPT

## 2022-07-09 PROCEDURE — 36415 COLL VENOUS BLD VENIPUNCTURE: CPT

## 2022-08-20 ENCOUNTER — LAB ENCOUNTER (OUTPATIENT)
Dept: LAB | Facility: HOSPITAL | Age: 37
End: 2022-08-20
Attending: OBSTETRICS & GYNECOLOGY
Payer: COMMERCIAL

## 2022-08-20 DIAGNOSIS — Z34.83 ENCOUNTER FOR SUPERVISION OF OTHER NORMAL PREGNANCY IN THIRD TRIMESTER: ICD-10-CM

## 2022-08-20 PROCEDURE — 87389 HIV-1 AG W/HIV-1&-2 AB AG IA: CPT

## 2022-08-20 PROCEDURE — 36415 COLL VENOUS BLD VENIPUNCTURE: CPT

## 2022-08-31 ENCOUNTER — HOSPITAL ENCOUNTER (OUTPATIENT)
Facility: HOSPITAL | Age: 37
Discharge: HOME | End: 2022-08-31
Attending: OBSTETRICS & GYNECOLOGY | Admitting: OBSTETRICS & GYNECOLOGY
Payer: COMMERCIAL

## 2022-08-31 VITALS
HEIGHT: 65 IN | TEMPERATURE: 99 F | SYSTOLIC BLOOD PRESSURE: 113 MMHG | BODY MASS INDEX: 27.66 KG/M2 | HEART RATE: 88 BPM | WEIGHT: 166 LBS | RESPIRATION RATE: 18 BRPM | DIASTOLIC BLOOD PRESSURE: 68 MMHG

## 2022-08-31 LAB
BILIRUB UR QL STRIP.AUTO: NEGATIVE
BILIRUBIN URINE: NEGATIVE
BLOOD URINE: NEGATIVE
CLARITY UR REFRACT.AUTO: CLEAR
COLOR UR AUTO: YELLOW
CONTROL RUN WITHIN 24 HOURS?: YES
GLUCOSE UR STRIP.AUTO-MCNC: NEGATIVE MG/DL
GLUCOSE URINE: NEGATIVE
KETONE URINE: NEGATIVE
KETONES UR STRIP.AUTO-MCNC: NEGATIVE MG/DL
LEUKOCYTE ESTERASE UR QL STRIP.AUTO: NEGATIVE
LEUKOCYTE ESTERASE URINE: NEGATIVE
NITRITE UR QL STRIP.AUTO: NEGATIVE
NITRITE URINE: NEGATIVE
PH UR STRIP.AUTO: 7 [PH] (ref 5–8)
PH URINE: 7 (ref 5–8)
PROT UR STRIP.AUTO-MCNC: NEGATIVE MG/DL
PROTEIN URINE: NEGATIVE
RBC UR QL AUTO: NEGATIVE
SP GR UR STRIP.AUTO: 1.01 (ref 1–1.03)
SPEC GRAVITY: 1.01 (ref 1–1.03)
URINE CLARITY: CLEAR
URINE COLOR: YELLOW
UROBILINOGEN UR STRIP.AUTO-MCNC: 1 MG/DL
UROBILINOGEN URINE: 1

## 2022-08-31 PROCEDURE — 99213 OFFICE O/P EST LOW 20 MIN: CPT

## 2022-08-31 PROCEDURE — 59025 FETAL NON-STRESS TEST: CPT

## 2022-08-31 PROCEDURE — 96372 THER/PROPH/DIAG INJ SC/IM: CPT

## 2022-08-31 PROCEDURE — 81003 URINALYSIS AUTO W/O SCOPE: CPT | Performed by: OBSTETRICS & GYNECOLOGY

## 2022-08-31 RX ORDER — TERBUTALINE SULFATE 1 MG/ML
0.25 INJECTION, SOLUTION SUBCUTANEOUS
Status: COMPLETED | OUTPATIENT
Start: 2022-08-31 | End: 2022-08-31

## 2022-08-31 RX ORDER — TERBUTALINE SULFATE 1 MG/ML
INJECTION, SOLUTION SUBCUTANEOUS
Status: COMPLETED
Start: 2022-08-31 | End: 2022-08-31

## 2022-08-31 NOTE — PROGRESS NOTES
Discharge instructions discussed with patient. Patient verbalized understanding.  Patient not in active labor at this time

## 2022-08-31 NOTE — PROGRESS NOTES
Dr. Leona Schultz called and notified of patient arrival and chief complaints. Orders given to perform SVE, send urine for dip and culture, PO fluids and 1 dose of terbutaline. Patient agreeable to POC.

## 2022-08-31 NOTE — PROGRESS NOTES
Pt is a 31year old female admitted to TR/TRG4-A. Patient presents with:  Contractions: patient states she been having uncomfortable painful contractions that started last night. Pt is  32w2d intra-uterine pregnancy. History obtained, consents signed. Oriented to room, staff, and plan of care.

## 2022-08-31 NOTE — PROGRESS NOTES
Dr. Mira Herrera called and given update. Orders given for discharge. Patient is to make an appointment within one week. Pelvic rest to begin.

## 2022-08-31 NOTE — NST
Nonstress Test   Patient: Sulema Arthur    Gestation: 32w2d    NST:       Variability: Moderate           Accelerations: Yes           Decelerations: None            Baseline: 140 BPM           Uterine Irritability: No           Contractions: Irregular                    Contraction Frequency: X4                   Acoustic Stimulator: No           Nonstress Test Interpretation: Reactive           Nonstress Test Second Interpretation: Reactive                     Additional Comments

## 2022-09-01 ENCOUNTER — HOSPITAL ENCOUNTER (OUTPATIENT)
Facility: HOSPITAL | Age: 37
Discharge: HOME | End: 2022-09-01
Attending: OBSTETRICS & GYNECOLOGY | Admitting: OBSTETRICS & GYNECOLOGY
Payer: COMMERCIAL

## 2022-09-01 VITALS
SYSTOLIC BLOOD PRESSURE: 104 MMHG | DIASTOLIC BLOOD PRESSURE: 64 MMHG | TEMPERATURE: 98 F | HEART RATE: 81 BPM | RESPIRATION RATE: 18 BRPM

## 2022-09-01 LAB — FIBRONECTIN FETAL VAG QL: NEGATIVE

## 2022-09-01 PROCEDURE — 82731 ASSAY OF FETAL FIBRONECTIN: CPT | Performed by: OBSTETRICS & GYNECOLOGY

## 2022-09-01 PROCEDURE — 59025 FETAL NON-STRESS TEST: CPT

## 2022-09-01 PROCEDURE — 96372 THER/PROPH/DIAG INJ SC/IM: CPT

## 2022-09-01 PROCEDURE — 99213 OFFICE O/P EST LOW 20 MIN: CPT

## 2022-09-01 RX ORDER — ALBUTEROL SULFATE 90 UG/1
AEROSOL, METERED RESPIRATORY (INHALATION) EVERY 6 HOURS PRN
COMMUNITY

## 2022-09-01 RX ORDER — TERBUTALINE SULFATE 1 MG/ML
0.25 INJECTION, SOLUTION SUBCUTANEOUS ONCE
Status: COMPLETED | OUTPATIENT
Start: 2022-09-01 | End: 2022-09-01

## 2022-09-01 NOTE — NST
Nonstress Test   Patient: Evon Sport    Gestation: 32w3d    NST:       Variability: Moderate           Accelerations: Yes           Decelerations: None            Baseline: 135 BPM           Uterine Irritability: No           Contractions: Irregular                    Contraction Frequency: 2-11                   Acoustic Stimulator: No           Nonstress Test Interpretation: Reactive           Nonstress Test Second Interpretation: Reactive                     Additional Comments

## 2022-09-01 NOTE — PROGRESS NOTES
Patient discharged home with personal belongings. Written and verbal discharge orders given. All questions answered. Patient ambulated off unit, not in active labor.

## 2022-09-01 NOTE — PROGRESS NOTES
Pt is a 31year old female admitted to TR[facility]. Patient presents with:  R/o Labor: ctx q7-10 mins, 2/10 pain, active fetal movment and denies leaking of fluid and bleeding     Pt is  32w3d intra-uterine pregnancy. History obtained, consents signed. Oriented to room, staff, and plan of care.

## 2022-09-15 ENCOUNTER — HOSPITAL ENCOUNTER (OUTPATIENT)
Facility: HOSPITAL | Age: 37
Discharge: HOME | End: 2022-09-15
Attending: OBSTETRICS & GYNECOLOGY | Admitting: OBSTETRICS & GYNECOLOGY

## 2022-09-15 VITALS
SYSTOLIC BLOOD PRESSURE: 132 MMHG | RESPIRATION RATE: 16 BRPM | HEART RATE: 111 BPM | TEMPERATURE: 98 F | DIASTOLIC BLOOD PRESSURE: 84 MMHG

## 2022-09-15 PROCEDURE — 99213 OFFICE O/P EST LOW 20 MIN: CPT

## 2022-09-15 PROCEDURE — 59025 FETAL NON-STRESS TEST: CPT

## 2022-09-15 PROCEDURE — 96372 THER/PROPH/DIAG INJ SC/IM: CPT

## 2022-09-15 RX ORDER — CETIRIZINE HYDROCHLORIDE 10 MG/1
10 TABLET ORAL DAILY
COMMUNITY

## 2022-09-15 RX ORDER — TERBUTALINE SULFATE 1 MG/ML
0.25 INJECTION, SOLUTION SUBCUTANEOUS ONCE
Status: COMPLETED | OUTPATIENT
Start: 2022-09-15 | End: 2022-09-15

## 2022-09-15 NOTE — PROGRESS NOTES
Pt is a 30year old female admitted to TRG3/TRG3-A. Patient presents with:  R/o  Labor: CXs every 4-7mins starting at 11:30 this morning increasing in frequency and intensity. Denies LOF or vaginal bleeding. +FM. Pt is  34w3d intra-uterine pregnancy. History obtained, consents signed. Oriented to room, staff, and plan of care. EFM tested and applied. Abdomen soft and non-tender.

## 2022-09-16 NOTE — PROGRESS NOTES
Pt given both written and verbal discharge instructions. Pt verbalized understanding. Questions answered. Pt discharged home in stable condition.

## 2022-09-16 NOTE — NST
Nonstress Test   Patient: Maki Pacheco    Gestation: 34w3d    NST:       Variability: Moderate           Accelerations: Yes           Decelerations: None            Baseline: 140 BPM           Uterine Irritability: Yes           Contractions: Irregular                                        Acoustic Stimulator: No           Nonstress Test Interpretation: Reactive                                 Additional Comments         NST reactive  Not in labor

## 2022-09-24 ENCOUNTER — HOSPITAL ENCOUNTER (OUTPATIENT)
Facility: HOSPITAL | Age: 37
Discharge: HOME | End: 2022-09-24
Attending: OBSTETRICS & GYNECOLOGY | Admitting: OBSTETRICS & GYNECOLOGY

## 2022-09-24 VITALS
SYSTOLIC BLOOD PRESSURE: 108 MMHG | HEART RATE: 81 BPM | DIASTOLIC BLOOD PRESSURE: 60 MMHG | TEMPERATURE: 99 F | RESPIRATION RATE: 14 BRPM

## 2022-09-24 PROCEDURE — 99214 OFFICE O/P EST MOD 30 MIN: CPT

## 2022-09-24 PROCEDURE — 59025 FETAL NON-STRESS TEST: CPT

## 2022-09-24 NOTE — PROGRESS NOTES
Pt is a 30year old female admitted to TR/TR-A. Patient presents with:  R/o Rom: pt c/o leaking of fld yesterday and today, also c/o cramping     Pt is  35w5d intra-uterine pregnancy. History obtained, consents signed. Oriented to room, staff, and plan of care. EFM tested and applied. Abdomen soft and non-tender.

## 2022-09-24 NOTE — NST
Nonstress Test   Patient: Frank Hartmann    Gestation: 35w5d    NST:       Variability: Moderate           Accelerations: Yes           Decelerations: None            FHR = 130           Uterine Irritability: Yes           Contractions: Irregular                                        Acoustic Stimulator: No           Nonstress Test Interpretation: Reactive           Nonstress Test Second Interpretation: Reactive                     Additional Comments  Reactive NST, negative spontaneous CST

## 2022-09-24 NOTE — PROGRESS NOTES
Discharge instructions given. Advised pt pelvic rest and keep her OB appointment on Tues, 9/27, will check AMADO level in the office. Instructed pt to call OB if leaking fld, contractions q 5 min, vaginal bleeding, or decreased fetal movement. Verbalized understanding. Ambulated to car in stable condition.

## 2022-09-27 PROCEDURE — 87653 STREP B DNA AMP PROBE: CPT | Performed by: OBSTETRICS & GYNECOLOGY

## 2022-09-27 PROCEDURE — 87081 CULTURE SCREEN ONLY: CPT | Performed by: OBSTETRICS & GYNECOLOGY

## 2022-09-29 PROBLEM — Z22.330 CARRIER OF GROUP B STREPTOCOCCUS: Status: ACTIVE | Noted: 2022-09-29

## 2022-10-12 ENCOUNTER — TELEPHONE (OUTPATIENT)
Dept: OBGYN UNIT | Facility: HOSPITAL | Age: 37
End: 2022-10-12

## 2022-10-13 ENCOUNTER — HOSPITAL ENCOUNTER (INPATIENT)
Facility: HOSPITAL | Age: 37
LOS: 2 days | Discharge: HOME | End: 2022-10-15
Attending: OBSTETRICS & GYNECOLOGY | Admitting: OBSTETRICS & GYNECOLOGY
Payer: COMMERCIAL

## 2022-10-13 PROCEDURE — 99214 OFFICE O/P EST MOD 30 MIN: CPT

## 2022-10-14 ENCOUNTER — ANESTHESIA EVENT (OUTPATIENT)
Dept: OBGYN UNIT | Facility: HOSPITAL | Age: 37
End: 2022-10-14
Payer: COMMERCIAL

## 2022-10-14 ENCOUNTER — ANESTHESIA (OUTPATIENT)
Dept: OBGYN UNIT | Facility: HOSPITAL | Age: 37
End: 2022-10-14
Payer: COMMERCIAL

## 2022-10-14 PROBLEM — Z34.90 PREGNANCY: Status: ACTIVE | Noted: 2022-10-14

## 2022-10-14 PROBLEM — Z34.90 PREGNANCY (HCC): Status: ACTIVE | Noted: 2022-10-14

## 2022-10-14 LAB
ANTIBODY SCREEN: NEGATIVE
BASOPHILS # BLD AUTO: 0.06 X10(3) UL (ref 0–0.2)
BASOPHILS NFR BLD AUTO: 0.6 %
EOSINOPHIL # BLD AUTO: 0.08 X10(3) UL (ref 0–0.7)
EOSINOPHIL NFR BLD AUTO: 0.8 %
ERYTHROCYTE [DISTWIDTH] IN BLOOD BY AUTOMATED COUNT: 12.1 %
HCT VFR BLD AUTO: 37.6 %
HGB BLD-MCNC: 12.8 G/DL
IMM GRANULOCYTES # BLD AUTO: 0.15 X10(3) UL (ref 0–1)
IMM GRANULOCYTES NFR BLD: 1.5 %
LYMPHOCYTES # BLD AUTO: 2.17 X10(3) UL (ref 1–4)
LYMPHOCYTES NFR BLD AUTO: 21.3 %
MCH RBC QN AUTO: 31.6 PG (ref 26–34)
MCHC RBC AUTO-ENTMCNC: 34 G/DL (ref 31–37)
MCV RBC AUTO: 92.8 FL
MONOCYTES # BLD AUTO: 0.61 X10(3) UL (ref 0.1–1)
MONOCYTES NFR BLD AUTO: 6 %
NEUTROPHILS # BLD AUTO: 7.1 X10 (3) UL (ref 1.5–7.7)
NEUTROPHILS # BLD AUTO: 7.1 X10(3) UL (ref 1.5–7.7)
NEUTROPHILS NFR BLD AUTO: 69.8 %
PLATELET # BLD AUTO: 190 10(3)UL (ref 150–450)
RBC # BLD AUTO: 4.05 X10(6)UL
RH BLOOD TYPE: POSITIVE
SARS-COV-2 RNA RESP QL NAA+PROBE: NOT DETECTED
T PALLIDUM AB SER QL IA: NONREACTIVE
WBC # BLD AUTO: 10.2 X10(3) UL (ref 4–11)

## 2022-10-14 PROCEDURE — 86780 TREPONEMA PALLIDUM: CPT | Performed by: OBSTETRICS & GYNECOLOGY

## 2022-10-14 PROCEDURE — 86901 BLOOD TYPING SEROLOGIC RH(D): CPT | Performed by: OBSTETRICS & GYNECOLOGY

## 2022-10-14 PROCEDURE — 86900 BLOOD TYPING SEROLOGIC ABO: CPT | Performed by: OBSTETRICS & GYNECOLOGY

## 2022-10-14 PROCEDURE — 85025 COMPLETE CBC W/AUTO DIFF WBC: CPT | Performed by: OBSTETRICS & GYNECOLOGY

## 2022-10-14 PROCEDURE — 86850 RBC ANTIBODY SCREEN: CPT | Performed by: OBSTETRICS & GYNECOLOGY

## 2022-10-14 RX ORDER — SODIUM CHLORIDE, SODIUM LACTATE, POTASSIUM CHLORIDE, CALCIUM CHLORIDE 600; 310; 30; 20 MG/100ML; MG/100ML; MG/100ML; MG/100ML
INJECTION, SOLUTION INTRAVENOUS CONTINUOUS
Status: DISCONTINUED | OUTPATIENT
Start: 2022-10-14 | End: 2022-10-14 | Stop reason: HOSPADM

## 2022-10-14 RX ORDER — IBUPROFEN 600 MG/1
600 TABLET ORAL EVERY 6 HOURS PRN
Status: DISCONTINUED | OUTPATIENT
Start: 2022-10-14 | End: 2022-10-14 | Stop reason: HOSPADM

## 2022-10-14 RX ORDER — SIMETHICONE 80 MG
80 TABLET,CHEWABLE ORAL 3 TIMES DAILY PRN
Status: DISCONTINUED | OUTPATIENT
Start: 2022-10-14 | End: 2022-10-15

## 2022-10-14 RX ORDER — NALBUPHINE HCL 10 MG/ML
2.5 AMPUL (ML) INJECTION
Status: DISCONTINUED | OUTPATIENT
Start: 2022-10-14 | End: 2022-10-14

## 2022-10-14 RX ORDER — ACETAMINOPHEN 500 MG
500 TABLET ORAL EVERY 6 HOURS PRN
Status: DISCONTINUED | OUTPATIENT
Start: 2022-10-14 | End: 2022-10-15

## 2022-10-14 RX ORDER — IBUPROFEN 600 MG/1
600 TABLET ORAL EVERY 6 HOURS
Status: DISCONTINUED | OUTPATIENT
Start: 2022-10-14 | End: 2022-10-15

## 2022-10-14 RX ORDER — BISACODYL 10 MG
10 SUPPOSITORY, RECTAL RECTAL ONCE AS NEEDED
Status: DISCONTINUED | OUTPATIENT
Start: 2022-10-14 | End: 2022-10-15

## 2022-10-14 RX ORDER — TRISODIUM CITRATE DIHYDRATE AND CITRIC ACID MONOHYDRATE 500; 334 MG/5ML; MG/5ML
30 SOLUTION ORAL AS NEEDED
Status: DISCONTINUED | OUTPATIENT
Start: 2022-10-14 | End: 2022-10-14 | Stop reason: HOSPADM

## 2022-10-14 RX ORDER — ALBUTEROL SULFATE 90 UG/1
2 AEROSOL, METERED RESPIRATORY (INHALATION) EVERY 6 HOURS PRN
Status: DISCONTINUED | OUTPATIENT
Start: 2022-10-14 | End: 2022-10-15

## 2022-10-14 RX ORDER — DOCUSATE SODIUM 100 MG/1
100 CAPSULE, LIQUID FILLED ORAL 2 TIMES DAILY
Status: DISCONTINUED | OUTPATIENT
Start: 2022-10-14 | End: 2022-10-15

## 2022-10-14 RX ORDER — ACETAMINOPHEN 500 MG
1000 TABLET ORAL EVERY 6 HOURS PRN
Status: DISCONTINUED | OUTPATIENT
Start: 2022-10-14 | End: 2022-10-15

## 2022-10-14 RX ORDER — ACETAMINOPHEN 500 MG
500 TABLET ORAL EVERY 6 HOURS PRN
Status: DISCONTINUED | OUTPATIENT
Start: 2022-10-14 | End: 2022-10-14 | Stop reason: HOSPADM

## 2022-10-14 RX ORDER — BUPIVACAINE HCL/0.9 % NACL/PF 0.25 %
5 PLASTIC BAG, INJECTION (ML) EPIDURAL AS NEEDED
Status: DISCONTINUED | OUTPATIENT
Start: 2022-10-14 | End: 2022-10-14

## 2022-10-14 RX ORDER — AMMONIA INHALANTS 0.04 G/.3ML
0.3 INHALANT RESPIRATORY (INHALATION) AS NEEDED
Status: DISCONTINUED | OUTPATIENT
Start: 2022-10-14 | End: 2022-10-14 | Stop reason: HOSPADM

## 2022-10-14 RX ORDER — TERBUTALINE SULFATE 1 MG/ML
0.25 INJECTION, SOLUTION SUBCUTANEOUS AS NEEDED
Status: DISCONTINUED | OUTPATIENT
Start: 2022-10-14 | End: 2022-10-14 | Stop reason: HOSPADM

## 2022-10-14 RX ORDER — MISOPROSTOL 200 UG/1
1000 TABLET ORAL ONCE AS NEEDED
Status: ACTIVE | OUTPATIENT
Start: 2022-10-14 | End: 2022-10-14

## 2022-10-14 RX ORDER — DEXTROSE, SODIUM CHLORIDE, SODIUM LACTATE, POTASSIUM CHLORIDE, AND CALCIUM CHLORIDE 5; .6; .31; .03; .02 G/100ML; G/100ML; G/100ML; G/100ML; G/100ML
INJECTION, SOLUTION INTRAVENOUS AS NEEDED
Status: DISCONTINUED | OUTPATIENT
Start: 2022-10-14 | End: 2022-10-14 | Stop reason: HOSPADM

## 2022-10-14 RX ORDER — ONDANSETRON 2 MG/ML
4 INJECTION INTRAMUSCULAR; INTRAVENOUS EVERY 6 HOURS PRN
Status: DISCONTINUED | OUTPATIENT
Start: 2022-10-14 | End: 2022-10-14 | Stop reason: HOSPADM

## 2022-10-14 NOTE — ANESTHESIA PROCEDURE NOTES
Labor Analgesia  Performed by: Mil Castle MD  Authorized by: Mil Castle MD       General Information and Staff    Start Time:  10/14/2022 3:10 AM  End Time:  10/14/2022 3:27 AM  Anesthesiologist:  Mil Castle MD  Performed by:   Anesthesiologist  Patient Location:  OB  Site Identification: surface landmarks    Reason for Block: labor epidural    Preanesthetic Checklist: patient identified, IV checked, risks and benefits discussed, monitors and equipment checked, pre-op evaluation, timeout performed, IV bolus, anesthesia consent and sterile technique used      Procedure Details    Patient Position:  Sitting  Prep: ChloraPrep    Monitoring:  Heart rate and continuous pulse ox  Approach:  Midline    Epidural Needle    Injection Technique:   Needle Type: Tuohy  Needle Gauge:  17 G  Needle Length:  3.375 in  Needle Insertion Depth:  5  Location:  L3-4    Spinal Needle      Catheter    Catheter Type:  End hole  Catheter Size:  19 G  Catheter at Skin Depth:  10  Test Dose:  Negative    Assessment      Additional Comments     Test Dose Given at 0327 am  Fentanyl 2 mc/ml + Ropivicaine 0.15% epidural infusion 12cc/hr  Fentanyl 2 mc/ml + Ropivicaine 0.15% epidural bolus 10cc  Fentanyl 50 mcg/mL 100 mcg

## 2022-10-14 NOTE — PROGRESS NOTES
ADMISSION NOTE  Patient admitted to room in stable condition via: wheelchair     Oriented to room, Safety precautions initiated. Bed in low position, call light in reach. Report received and ID Bands checked & verified with: Jenniffer Call RN  Plan of care and safety instructions reviewed, teaching sheets at bedside. VS and assessment initiated.

## 2022-10-14 NOTE — PLAN OF CARE
Problem: BIRTH - VAGINAL/ SECTION  Goal: Fetal and maternal status remain reassuring during the birth process  Description: INTERVENTIONS:  - Monitor vital signs  - Monitor fetal heart rate  - Monitor uterine activity  - Monitor labor progression (vaginal delivery)  - DVT prophylaxis (C/S delivery)  - Surgical antibiotic prophylaxis (C/S delivery)  Outcome: Progressing     Problem: PAIN - ADULT  Goal: Verbalizes/displays adequate comfort level or patient's stated pain goal  Description: INTERVENTIONS:  - Encourage pt to monitor pain and request assistance  - Assess pain using appropriate pain scale  - Administer analgesics based on type and severity of pain and evaluate response  - Implement non-pharmacological measures as appropriate and evaluate response  - Consider cultural and social influences on pain and pain management  - Manage/alleviate anxiety  - Utilize distraction and/or relaxation techniques  - Monitor for opioid side effects  - Notify MD/LIP if interventions unsuccessful or patient reports new pain  - Anticipate increased pain with activity and pre-medicate as appropriate  Outcome: Progressing     Problem: ANXIETY  Goal: Will report anxiety at manageable levels  Description: INTERVENTIONS:  - Administer medication as ordered  - Teach and rehearse alternative coping skills  - Provide emotional support with 1:1 interaction with staff  Outcome: Progressing     Problem: Patient/Family Goals  Goal: Patient/Family Long Term Goal  Description: Patient's Long Term Goal: Uncomplicated vaginal delivery    Interventions:  VS per protocol  I&O  Ice chips and sips as tolerated  EFM per protocol  Maintain IV as ordered  Antibiotics as needed per protocol  Informed consent     Interventions:  - See additional Care Plan goals for specific interventions  Outcome: Progressing  Goal: Patient/Family Short Term Goal  Description: Patient's Short Term Goal: Adequate pain control with delivery of infant  Interventions:  Pain assessment scores as ordered  Patient scores pain a \"3\" or less  Multidisciplinary care   Nonpharmacologic comfort measures     Interventions:   - See additional Care Plan goals for specific interventions  Outcome: Progressing     Problem: SAFETY ADULT - FALL  Goal: Free from fall injury  Description: INTERVENTIONS:  - Assess pt frequently for physical needs  - Identify cognitive and physical deficits and behaviors that affect risk of falls.   - [place] fall precautions as indicated by assessment.  - Educate pt/family on patient safety including physical limitations  - Instruct pt to call for assistance with activity based on assessment  - Modify environment to reduce risk of injury  - Provide assistive devices as appropriate  - Consider OT/PT consult to assist with strengthening/mobility  - Encourage toileting schedule  Outcome: Progressing

## 2022-10-14 NOTE — PLAN OF CARE
Problem: BIRTH - VAGINAL/ SECTION  Goal: Fetal and maternal status remain reassuring during the birth process  Description: INTERVENTIONS:  - Monitor vital signs  - Monitor fetal heart rate  - Monitor uterine activity  - Monitor labor progression (vaginal delivery)  - DVT prophylaxis (C/S delivery)  - Surgical antibiotic prophylaxis (C/S delivery)  10/14/2022 1127 by Bora Salmon RN  Outcome: Completed  10/14/2022 0751 by Bora Salmon RN  Outcome: Progressing     Problem: PAIN - ADULT  Goal: Verbalizes/displays adequate comfort level or patient's stated pain goal  Description: INTERVENTIONS:  - Encourage pt to monitor pain and request assistance  - Assess pain using appropriate pain scale  - Administer analgesics based on type and severity of pain and evaluate response  - Implement non-pharmacological measures as appropriate and evaluate response  - Consider cultural and social influences on pain and pain management  - Manage/alleviate anxiety  - Utilize distraction and/or relaxation techniques  - Monitor for opioid side effects  - Notify MD/LIP if interventions unsuccessful or patient reports new pain  - Anticipate increased pain with activity and pre-medicate as appropriate  10/14/2022 1127 by Bora Salmon RN  Outcome: Completed  10/14/2022 0751 by Bora Salmon RN  Outcome: Progressing     Problem: ANXIETY  Goal: Will report anxiety at manageable levels  Description: INTERVENTIONS:  - Administer medication as ordered  - Teach and rehearse alternative coping skills  - Provide emotional support with 1:1 interaction with staff  10/14/2022 1127 by Bora Salmon RN  Outcome: Completed  10/14/2022 0751 by Bora Salmon RN  Outcome: Progressing     Problem: Patient/Family Goals  Goal: Patient/Family Long Term Goal  Description: Patient's Long Term Goal:     Interventions:  -   - See additional Care Plan goals for specific interventions  10/14/2022 1127 by Bora Salmon RN  Outcome: Completed  10/14/2022 8652 by Enrique Butterfield RN  Outcome: Progressing  Goal: Patient/Family Short Term Goal  Description: Patient's Short Term Goal:     Interventions:   -  - See additional Care Plan goals for specific interventions  10/14/2022 1127 by Enrique Butterfield RN  Outcome: Completed  10/14/2022 0751 by Enrique Butterfield RN  Outcome: Progressing     Problem: SAFETY ADULT - FALL  Goal: Free from fall injury  Description: INTERVENTIONS:  - Assess pt frequently for physical needs  - Identify cognitive and physical deficits and behaviors that affect risk of falls.   - [place] fall precautions as indicated by assessment.  - Educate pt/family on patient safety including physical limitations  - Instruct pt to call for assistance with activity based on assessment  - Modify environment to reduce risk of injury  - Provide assistive devices as appropriate  - Consider OT/PT consult to assist with strengthening/mobility  - Encourage toileting schedule  10/14/2022 1127 by Enrique Butterfield RN  Outcome: Completed  10/14/2022 0751 by Enrique Butterfield RN  Outcome: Progressing

## 2022-10-14 NOTE — PROGRESS NOTES
Pt is a 30year old female admitted to [facility]5/[facility]. Patient presents with:  R/o Labor: Patient here with c/o contractions every 2-4 minutes since . Patient denies leaking of fluid or vaginal bleeding. +FM. Pt is  38w3d intra-uterine pregnancy. History obtained. Oriented to room, staff, and plan of care.

## 2022-10-14 NOTE — PROGRESS NOTES
Patient up to bathroom with assist x 2. Voided at this time. Patient transferred to mother/baby room per wheelchair in stable condition with baby and personal belongings. Accompanied by significant other and staff. Report given to mother/baby RN.

## 2022-10-14 NOTE — L&D DELIVERY NOTE
Vaginal Delivery Note          Stella Garcia Patient Status:  Inpatient    1985 MRN [id number]   Location [facility] Attending Isak Marc MD   Hosp Day # 1 PCP Rashmi Smith MD       Pre Op Dx:  IUP at 45 weeks in labor          Post Op Dx: Same - delivered    Op: Normal Spontaneous Vaginal Delivery    Surgeon:  Geoffrey Whatley       Anesthesia: Epidural    QBL:   Pending (EBL minimal) cc's    Indications:  See Delivery Summary    Findings:    Head: OA, Sex: Male    Weight: 7 # 15 oz     Apgars:  8/9    Shoulders:  normal   Nuchal: loose X 1,  Placenta: Intact with 3 vessels  Unique findings: none       Procedure: The patients was placed in the dorsolithotomy position and prepped. She was encouraged to push. As the head was delivered the legs were lowered and the perineum was supported to decrease the risk of tearing. The infants nose and mouth were bulb suctioned and the nuchal cord  was reduced. The shoulders rotated easily. The infant was dried and suctioned. The baby was placed on the mothers abdomen at her request.  The cord was doubly clamped and cut after 30 seconds. The cord blood was sampled. IV pitocin and gentle uterine massage helped delivery of the placenta intact with 3 vessels. Umbilical cord gases were not sent. The 1 cm midline episiotomy closed in a single layer with 3.0 Rapide Vicryl. Rectal-vaginal exam was normal.     Bleeding was minimal.  The patient was then moved to the supine position in stable condition. Counts were correct. Complications:  None    Mother and infant in good condition.

## 2022-10-15 VITALS
HEART RATE: 72 BPM | DIASTOLIC BLOOD PRESSURE: 79 MMHG | HEIGHT: 65 IN | RESPIRATION RATE: 16 BRPM | OXYGEN SATURATION: 100 % | TEMPERATURE: 98 F | BODY MASS INDEX: 29.82 KG/M2 | SYSTOLIC BLOOD PRESSURE: 113 MMHG | WEIGHT: 179 LBS

## 2022-10-15 LAB
BASOPHILS # BLD AUTO: 0.05 X10(3) UL (ref 0–0.2)
BASOPHILS NFR BLD AUTO: 0.6 %
EOSINOPHIL # BLD AUTO: 0.07 X10(3) UL (ref 0–0.7)
EOSINOPHIL NFR BLD AUTO: 0.8 %
ERYTHROCYTE [DISTWIDTH] IN BLOOD BY AUTOMATED COUNT: 12.5 %
HCT VFR BLD AUTO: 37.3 %
HGB BLD-MCNC: 12.6 G/DL
IMM GRANULOCYTES # BLD AUTO: 0.13 X10(3) UL (ref 0–1)
IMM GRANULOCYTES NFR BLD: 1.5 %
LYMPHOCYTES # BLD AUTO: 1.82 X10(3) UL (ref 1–4)
LYMPHOCYTES NFR BLD AUTO: 20.7 %
MCH RBC QN AUTO: 32.4 PG (ref 26–34)
MCHC RBC AUTO-ENTMCNC: 33.8 G/DL (ref 31–37)
MCV RBC AUTO: 95.9 FL
MONOCYTES # BLD AUTO: 0.41 X10(3) UL (ref 0.1–1)
MONOCYTES NFR BLD AUTO: 4.7 %
NEUTROPHILS # BLD AUTO: 6.32 X10 (3) UL (ref 1.5–7.7)
NEUTROPHILS # BLD AUTO: 6.32 X10(3) UL (ref 1.5–7.7)
NEUTROPHILS NFR BLD AUTO: 71.7 %
PLATELET # BLD AUTO: 198 10(3)UL (ref 150–450)
RBC # BLD AUTO: 3.89 X10(6)UL
WBC # BLD AUTO: 8.8 X10(3) UL (ref 4–11)

## 2022-10-15 PROCEDURE — 90471 IMMUNIZATION ADMIN: CPT

## 2022-10-15 PROCEDURE — 85025 COMPLETE CBC W/AUTO DIFF WBC: CPT | Performed by: OBSTETRICS & GYNECOLOGY

## 2022-10-15 PROCEDURE — 3E02340 INTRODUCTION OF INFLUENZA VACCINE INTO MUSCLE, PERCUTANEOUS APPROACH: ICD-10-PCS | Performed by: OBSTETRICS & GYNECOLOGY

## 2022-10-15 NOTE — PLAN OF CARE
Problem: POSTPARTUM  Goal: Long Term Goal:Experiences normal postpartum course  Description: INTERVENTIONS:  - Assess and monitor vital signs and lab values. - Assess fundus and lochia. - Provide ice/sitz baths for perineum discomfort. - Monitor healing of incision/episiotomy/laceration, and assess for signs and symptoms of infection and hematoma. - Assess bladder function and monitor for bladder distention.  - Provide/instruct/assist with pericare as needed. - Provide VTE prophylaxis as needed. - Monitor bowel function.  - Encourage ambulation and provide assistance as needed. - Assess and monitor emotional status and provide social service/psych resources as needed. - Utilize standard precautions and use personal protective equipment as indicated. Ensure aseptic care of all intravenous lines and invasive tubes/drains.  - Obtain immunization and exposure to communicable diseases history. 10/15/2022 1443 by Ynes Soriano RN  Outcome: Completed  10/15/2022 0746 by Ynes Soriano RN  Outcome: Progressing  Goal: Optimize infant feeding at the breast  Description: INTERVENTIONS:  - Initiate breast feeding within first hour after birth. - Monitor effectiveness of current breast feeding efforts. - Assess support systems available to mother/family.  - Identify cultural beliefs/practices regarding lactation, letdown techniques, maternal food preferences. - Assess mother's knowledge and previous experience with breast feeding.  - Provide information as needed about early infant feeding cues (e.g., rooting, lip smacking, sucking fingers/hand) versus late cue of crying.  - Discuss/demonstrate breast feeding aids (e.g., infant sling, nursing footstool/pillows, and breast pumps). - Encourage mother/other family members to express feelings/concerns, and actively listen. - Educate father/SO about benefits of breast feeding and how to manage common lactation challenges.   - Recommend avoidance of specific medications or substances incompatible with breast feeding.  - Assess and monitor for signs of nipple pain/trauma. - Instruct and provide assistance with proper latch. - Review techniques for milk expression (breast pumping) and storage of breast milk. Provide pumping equipment/supplies, instructions and assistance, as needed. - Encourage rooming-in and breast feeding on demand.  - Encourage skin-to-skin contact. - Provide LC support as needed. - Assess for and manage engorgement. - Provide breast feeding education handouts and information on community breast feeding support. 10/15/2022 1443 by Tamara Garcia RN  Outcome: Completed  10/15/2022 0746 by Tamara Garcia RN  Outcome: Progressing  Goal: Establishment of adequate milk supply with medication/procedure interruptions  Description: INTERVENTIONS:  - Review techniques for milk expression (breast pumping). - Provide pumping equipment/supplies, instructions, and assistance until it is safe to breastfeed infant. 10/15/2022 1443 by Tamara Garcia RN  Outcome: Completed  10/15/2022 0746 by Tamara Garcia RN  Outcome: Progressing  Goal: Appropriate maternal -  bonding  Description: INTERVENTIONS:  - Assess caregiver- interactions. - Assess caregiver's emotional status and coping mechanisms. - Encourage caregiver to participate in  daily care. - Assess support systems available to mother/family.  - Provide /case management support as needed.   10/15/2022 1443 by Tamara Garcia RN  Outcome: Completed  10/15/2022 0746 by Tamara Garcia RN  Outcome: Progressing

## 2022-10-15 NOTE — DISCHARGE SUMMARY
[facility]  Discharge Summary    Cory Oliveira Patient Status:  Inpatient    1985 MRN [id number]   [facility] 2SW-J Attending Aixa Addison MD   [facility] Day # 2 PCP Fernanda Churchill MD     Admit Date:  10/13/2022    EDC: Estimated Date of Delivery: 10/24/22    Gestational Age: 38w4d    Antepartum complications: See Prenatal Record    Date of Delivery: See Delivery Summary    Delivered By:  See Delivery Summary    Delivery Type: spontaneous vaginal delivery       Intrapartum Complications: See Delivery Summary    Episiotomy / lacerations: See Delivery Summary      Rh Immune Globulin Given:  See Prenatal Record and nursing notes    Rubella Vaccine Given: See Prenatal Record and nursing notes    Activity:  Routine post partum and post operative instructions if  section    Medications:  Motrin alternating with Tylenol    Diet:  General    Discharge Date: 10/15/2022          Plan:       Follow-up appointment in 6  weeks  Routine post partum instructions    Kevin Beckett MD  10/15/2022  1:34 PM

## 2022-10-17 NOTE — PAYOR COMM NOTE
--------------  DISCHARGE REVIEW    Payor: [facility]  Subscriber #:  [id number]  Authorization Number: [id number]    Admit date: 10/13/22  Admit time:  10:44 PM  Discharge Date: 10/15/2022  8:25 PM     Admitting Physician: Paula Zaragoza MD  Attending Physician:  No att. providers found  Primary Care Physician: Domenica Phelps MD          Discharge Summary Notes      Discharge Summary signed by Anahy Schultz MD at 10/15/2022  1:35 PM     Author: Anahy Schultz MD Specialty: OBSTETRICS & GYNECOLOGY Author Type: Physician    Filed: 10/15/2022  1:35 PM Date of Service: 10/15/2022  1:34 PM Status: Signed    : Anahy Schultz MD (Physician)         [facility]  Discharge Summary    Lina Shah Patient Status:  Inpatient    1985 MRN [id number]   [facility] 2SW-J Attending Paula Zaragoza MD   Hosp Day # 2 PCP Fuentes Willis MD     Admit Date:  10/13/2022    EDC: Estimated Date of Delivery: 10/24/22    Gestational Age: 38w4d    Antepartum complications: See Prenatal Record    Date of Delivery: See Delivery Summary    Delivered By:  See Delivery Summary    Delivery Type: spontaneous vaginal delivery       Intrapartum Complications: See Delivery Summary    Episiotomy / lacerations: See Delivery Summary      Rh Immune Globulin Given:  See Prenatal Record and nursing notes    Rubella Vaccine Given: See Prenatal Record and nursing notes    Activity:  Routine post partum and post operative instructions if  section    Medications:  Motrin alternating with Tylenol    Diet:  General    Discharge Date: 10/15/2022          Plan:       Follow-up appointment in 6  weeks  Routine post partum instructions    Aster Kulkarni MD  10/15/2022  1:34 PM    Electronically signed by Anahy Schultz MD on 10/15/2022  1:35 PM         REVIEWER COMMENTS

## 2022-10-18 ENCOUNTER — TELEPHONE (OUTPATIENT)
Dept: OBGYN UNIT | Facility: HOSPITAL | Age: 37
End: 2022-10-18

## 2022-10-20 ENCOUNTER — NURSE ONLY (OUTPATIENT)
Dept: LACTATION | Facility: HOSPITAL | Age: 37
End: 2022-10-20
Attending: OBSTETRICS & GYNECOLOGY
Payer: COMMERCIAL

## 2022-10-20 PROCEDURE — 99213 OFFICE O/P EST LOW 20 MIN: CPT

## 2023-11-22 ENCOUNTER — TELEPHONE (OUTPATIENT)
Dept: UROLOGY | Facility: CLINIC | Age: 38
End: 2023-11-22

## 2023-11-22 NOTE — TELEPHONE ENCOUNTER
Spoke with patient to remind of 11/28 appointment. Provided New Patient instructions, patient expressed understanding and confirmed appointment.

## 2023-11-28 ENCOUNTER — OFFICE VISIT (OUTPATIENT)
Dept: UROLOGY | Facility: CLINIC | Age: 38
End: 2023-11-28
Attending: OBSTETRICS & GYNECOLOGY
Payer: COMMERCIAL

## 2023-11-28 VITALS
HEIGHT: 65 IN | WEIGHT: 152.38 LBS | RESPIRATION RATE: 18 BRPM | HEART RATE: 78 BPM | BODY MASS INDEX: 25.39 KG/M2 | TEMPERATURE: 98 F | SYSTOLIC BLOOD PRESSURE: 100 MMHG | DIASTOLIC BLOOD PRESSURE: 68 MMHG

## 2023-11-28 DIAGNOSIS — R35.1 NOCTURIA: ICD-10-CM

## 2023-11-28 DIAGNOSIS — N81.84 PELVIC MUSCLE WASTING: ICD-10-CM

## 2023-11-28 DIAGNOSIS — N39.3 FEMALE STRESS INCONTINENCE: Primary | ICD-10-CM

## 2023-11-28 DIAGNOSIS — N81.6 RECTOCELE: ICD-10-CM

## 2023-11-28 DIAGNOSIS — N81.11 CYSTOCELE, MIDLINE: ICD-10-CM

## 2023-11-28 LAB
BLOOD URINE: NEGATIVE
CONTROL RUN WITHIN 24 HOURS?: YES
LEUKOCYTE ESTERASE URINE: NEGATIVE
NITRITE URINE: NEGATIVE

## 2023-11-28 PROCEDURE — 87086 URINE CULTURE/COLONY COUNT: CPT | Performed by: OBSTETRICS & GYNECOLOGY

## 2023-11-28 PROCEDURE — 99212 OFFICE O/P EST SF 10 MIN: CPT

## 2023-11-28 PROCEDURE — 81002 URINALYSIS NONAUTO W/O SCOPE: CPT | Performed by: OBSTETRICS & GYNECOLOGY

## 2023-12-11 ENCOUNTER — OFFICE VISIT (OUTPATIENT)
Dept: UROLOGY | Facility: CLINIC | Age: 38
End: 2023-12-11
Attending: OBSTETRICS & GYNECOLOGY
Payer: COMMERCIAL

## 2023-12-11 VITALS
WEIGHT: 152.38 LBS | SYSTOLIC BLOOD PRESSURE: 135 MMHG | DIASTOLIC BLOOD PRESSURE: 65 MMHG | BODY MASS INDEX: 25.39 KG/M2 | HEIGHT: 65 IN

## 2023-12-11 DIAGNOSIS — N81.11 CYSTOCELE, MIDLINE: ICD-10-CM

## 2023-12-11 DIAGNOSIS — N81.84 PELVIC MUSCLE WASTING: ICD-10-CM

## 2023-12-11 DIAGNOSIS — N81.6 RECTOCELE: ICD-10-CM

## 2023-12-11 DIAGNOSIS — N39.3 FEMALE STRESS INCONTINENCE: Primary | ICD-10-CM

## 2023-12-11 PROCEDURE — 99212 OFFICE O/P EST SF 10 MIN: CPT

## 2023-12-11 PROCEDURE — 57160 INSERT PESSARY/OTHER DEVICE: CPT

## 2023-12-11 NOTE — PROGRESS NOTES
Pt here for pessary trial due to pelvic organ prolapse, LEEANN    Wants to exhaust conservative options. She is not currently interested in surgical management of her pelvic organ prolapse. Desires home care    Did PFPT in the past with some improvement    Bowels irreg, using fiber    Urogynecology Summary:  Urogynecology Summary  Prolapse: No  LEEANN: Yes  Urge Incontinence: No  Nocturia Frequency: 2 (2-3)  Frequency: 1 - 2 hours  Incomplete emptying: Yes  Constipation: Yes  Wears pad day?: 0  Wears Pad Night?: 0  Activities are limited by UI/POP?: Yes (bulging)  Currently Sexually Active: Yes (sometimes pain during intercourse)    Vitals:  Vitals:    12/11/23 0918   BP: 135/65       GENERAL EXAM:  GENERAL: alert & oriented, NAD  HEENT: NC/AT, sclera without injection  SKIN: no rashes  LUNGS:  without increased respiratory effort  ABDOMEN: soft, non-tender, non-distended, no masses appreciated  EXT: no edema    PELVIC EXAM:  Ext. Gen: no atrophy, no lesions  Urethra: no atrophy, nontender  Bladder:some fullness, nontender  Vagina: no atrophy  Cervix: no bleeding, no lesions, nontender  Uterus: +mobile  Adnexa:no masses, nontender  Perineum: nontender  Anus: wnl  Rectum: defer     PELVIS FLOOR NEUROMUSCULAR FUNCTION:  Strength:  2 and Unable to hold greater than 3 sec  Perineal Sensation:  Normal        PELVIC SUPPORT:  [place]:  0  Ant:  2  Post:  2  CST:  negative  UVJ: +hypermobile      A #3 incontinence dish was placed without difficulty. Patient stated it was comfortable and supportive. Able to remove and insert independently. Able to void freely. Impression:    ICD-10-CM    1. Female stress incontinence  N39.3       2. Cystocele, midline  N81.11       3. Rectocele  N81.6       4. Pelvic muscle wasting  N81.84           Discussion Items:   Discussed management of pelvic organ prolapse including but not limited to behavioral modifications, conservative options, and surgical management.    Discussed pessary management including benefits and risks. Discussed importance of keeping regularly scheduled pessary checks in prevention of complications related to pessary use. Treatment Plan:  Continue pessary management with plan for home care  Call with s/sx of UTI, problems with pessary   Bowel regimen  Daily home pelvic floor exercises  All questions answered  She understands and agrees to plan    Return in about 4 weeks (around 1/8/2024) for pessary care.       Elle Ellison PA-C

## 2024-01-08 ENCOUNTER — OFFICE VISIT (OUTPATIENT)
Dept: UROLOGY | Facility: CLINIC | Age: 39
End: 2024-01-08
Attending: OBSTETRICS & GYNECOLOGY
Payer: COMMERCIAL

## 2024-01-08 VITALS
HEIGHT: 65 IN | BODY MASS INDEX: 25.39 KG/M2 | SYSTOLIC BLOOD PRESSURE: 115 MMHG | DIASTOLIC BLOOD PRESSURE: 70 MMHG | WEIGHT: 152.38 LBS

## 2024-01-08 DIAGNOSIS — N81.84 PELVIC MUSCLE WASTING: ICD-10-CM

## 2024-01-08 DIAGNOSIS — N81.6 RECTOCELE: ICD-10-CM

## 2024-01-08 DIAGNOSIS — N81.11 CYSTOCELE, MIDLINE: ICD-10-CM

## 2024-01-08 DIAGNOSIS — N39.3 FEMALE STRESS INCONTINENCE: Primary | ICD-10-CM

## 2024-01-08 PROCEDURE — 57160 INSERT PESSARY/OTHER DEVICE: CPT

## 2024-01-08 PROCEDURE — 99212 OFFICE O/P EST SF 10 MIN: CPT

## 2024-01-08 NOTE — PROGRESS NOTES
Pt presents for f/u of pelvic organ prolapse, LEEANN, pessary care  Using #3 incontinence dish with support, home care    Reports pessary is comfortable but still feels bulge and leaking with cough (no leaking with exercise)  Bulge more bothersome  Denies discharge  Denies bleeding  Denies s/sx of UTI  Bowels reg  Pt is removing pessary at home twice weekly    Happy with pessary, feels supported  She is not currently interested in surgical management of her pelvic organ prolapse    Urogynecology Summary:  Urogynecology Summary  Prolapse: Yes  LEEANN: Yes (mostly when she coughs)  Urge Incontinence: No  Nocturia Frequency: 2 (1-2 times, less with pessary in)  Frequency: 2 - 3 hours  Incomplete emptying: No  Constipation: No  Wears pad day?: 0  Wears Pad Night?: 0  Activities are limited by UI/POP?: Yes (heavyness (prolapse) even with pessary in)  Currently Sexually Active: Yes      Vitals:  Vitals:    01/08/24 1254   BP: 115/70       GENERAL EXAM:  GENERAL: alert & oriented, NAD  HEENT: NC/AT, sclera without injection  SKIN: no rashes  LUNGS:  without increased respiratory effort  ABDOMEN: soft, non-tender, non-distended, no masses appreciated  EXT: no edema    PELVIC EXAM:  Ext. Gen: no atrophy, no lesions  Urethra: no atrophy, nontender  Bladder:some fullness, nontender  Vagina: no atrophy  Cervix: no bleeding, no lesions, nontender  Uterus: +mobile  Adnexa: no masses, nontender  Perineum: nontender  Anus: wnl  Rectum: defer     PELVIS FLOOR NEUROMUSCULAR FUNCTION:  Strength:  2 and Unable to hold greater than 3 sec  Perineal Sensation:  Normal        PELVIC SUPPORT:  [place]:  0  Ant:  2  Post:  2  CST:  negative  UVJ: +hypermobile    Her pessary was removed, cleaned, and returned to patient  A #2 incontinence dish with support and large knob was inserted without difficulty (uncomfortable)  A #3 ring with support was inserted without difficulty  Pt reported it to be comfortable and supportive  Pt confident in ability to  remove and insert independently  Able to void freely    Impression:    ICD-10-CM    1. Female stress incontinence  N39.3       2. Cystocele, midline  N81.11       3. Rectocele  N81.6       4. Pelvic muscle wasting  N81.84           Discussion Items:   Discussed management of pelvic organ prolapse including but not limited to behavioral modifications, conservative options, and surgical management.   Discussed pessary management including benefits and risks. Discussed importance of keeping regularly scheduled pessary checks in prevention of complications related to pessary use.     Treatment Plan:  Continue pessary management, home care  Encouraged daily pelvic exercises  Cont bowel regimen  Call with s/sx of UTI, problems with pessary     All questions answered  Pt understands and agrees to plan       Return in about 4 weeks (around 2/5/2024) for pessary care.      Lucia Olson PA-C

## 2024-02-05 ENCOUNTER — OFFICE VISIT (OUTPATIENT)
Dept: UROLOGY | Facility: CLINIC | Age: 39
End: 2024-02-05
Attending: OBSTETRICS & GYNECOLOGY
Payer: COMMERCIAL

## 2024-02-05 VITALS
WEIGHT: 152.38 LBS | BODY MASS INDEX: 25.39 KG/M2 | SYSTOLIC BLOOD PRESSURE: 125 MMHG | DIASTOLIC BLOOD PRESSURE: 65 MMHG | HEIGHT: 65 IN

## 2024-02-05 DIAGNOSIS — N81.84 PELVIC MUSCLE WASTING: ICD-10-CM

## 2024-02-05 DIAGNOSIS — N81.6 RECTOCELE: ICD-10-CM

## 2024-02-05 DIAGNOSIS — N39.3 FEMALE STRESS INCONTINENCE: ICD-10-CM

## 2024-02-05 DIAGNOSIS — N81.11 CYSTOCELE, MIDLINE: Primary | ICD-10-CM

## 2024-02-05 PROCEDURE — 99212 OFFICE O/P EST SF 10 MIN: CPT

## 2024-02-05 NOTE — PROGRESS NOTES
Pt presents for f/u of pelvic organ prolapse, pessary care  Using #3 ring with support, home care    Reports pessary is comfortable   Denies discharge  Denies bleeding  Denies s/sx of UTI  Bowels reg  Pt is removing pessary at home once weekly    Happy with pessary, feels supported  She is not currently interested in surgical management of her pelvic organ prolapse    Urogynecology Summary:  Urogynecology Summary  Prolapse: No  LEEANN: No  Urge Incontinence: No  Nocturia Frequency: 1  Frequency: 2 - 3 hours  Incomplete emptying: No  Constipation: No  Wears pad day?: 0  Wears Pad Night?: 0  Activities are limited by UI/POP?: No  Currently Sexually Active: Yes      Vitals:  Vitals:    02/05/24 1252   BP: 125/65       GENERAL EXAM:  GENERAL: alert & oriented, NAD  HEENT: NC/AT, sclera without injection  SKIN: no rashes  LUNGS:  without increased respiratory effort  ABDOMEN: soft, non-tender, non-distended, no masses appreciated  EXT: no edema    PELVIC EXAM:  Ext. Gen: no atrophy, no lesions  Urethra: no atrophy, nontender  Bladder:some fullness, nontender  Vagina: no atrophy  Cervix: no bleeding, some erythema, nontender  Uterus: +mobile  Adnexa: no masses, nontender  Perineum: nontender  Anus: wnl  Rectum: defer     PELVIS FLOOR NEUROMUSCULAR FUNCTION:  Strength:  2 and Unable to hold greater than 3 sec  Perineal Sensation:  Normal        PELVIC SUPPORT:  [place]:  0  Ant:  2  Post:  2  CST:  negative  UVJ: +hypermobile    Her pessary was removed, cleaned, and reinserted without difficulty.    Impression:    ICD-10-CM    1. Cystocele, midline  N81.11       2. Rectocele  N81.6       3. Female stress incontinence  N39.3       4. Pelvic muscle wasting  N81.84           Discussion Items:   Discussed management of pelvic organ prolapse including but not limited to behavioral modifications, conservative options, and surgical management.   Discussed pessary management including benefits and risks. Discussed importance of keeping  regularly scheduled pessary checks in prevention of complications related to pessary use.     Treatment Plan:  Continue pessary management, home care, will leave out twice a week overnight  Encouraged daily pelvic exercises  Cont bowel regimen  Call with s/sx of UTI, problems with pessary     All questions answered  Pt understands and agrees to plan       Return in about 4 months (around 6/5/2024) for pessary check.      Lucia Olson PA-C

## 2024-03-08 ENCOUNTER — TELEPHONE (OUTPATIENT)
Dept: UROLOGY | Facility: CLINIC | Age: 39
End: 2024-03-08

## 2024-03-08 NOTE — TELEPHONE ENCOUNTER
Incoming vm from pt 3/7/24 with questions regarding pessary  Attempted call back today, no answer, no vm set up.

## 2024-03-19 ENCOUNTER — TELEPHONE (OUTPATIENT)
Dept: UROLOGY | Facility: CLINIC | Age: 39
End: 2024-03-19

## 2024-03-19 NOTE — TELEPHONE ENCOUNTER
TC from pt today saying her pessary was causing some discomfort like it was slipping. Pt pulled it out. Noted scant amt of blood to # 3 ring w/ support, but her period also just started. Interested in trying another pessary. Scheduled w/ YESSICA Don for next wk.

## 2024-03-25 ENCOUNTER — OFFICE VISIT (OUTPATIENT)
Dept: UROLOGY | Facility: CLINIC | Age: 39
End: 2024-03-25
Attending: PHYSICIAN ASSISTANT
Payer: COMMERCIAL

## 2024-03-25 VITALS — HEIGHT: 65 IN | TEMPERATURE: 98 F | RESPIRATION RATE: 18 BRPM | BODY MASS INDEX: 25 KG/M2

## 2024-03-25 DIAGNOSIS — N81.84 PELVIC MUSCLE WASTING: ICD-10-CM

## 2024-03-25 DIAGNOSIS — N39.3 FEMALE STRESS INCONTINENCE: ICD-10-CM

## 2024-03-25 DIAGNOSIS — R39.14 FEELING OF INCOMPLETE BLADDER EMPTYING: ICD-10-CM

## 2024-03-25 DIAGNOSIS — N81.11 CYSTOCELE, MIDLINE: Primary | ICD-10-CM

## 2024-03-25 DIAGNOSIS — N81.6 RECTOCELE: ICD-10-CM

## 2024-03-25 DIAGNOSIS — R35.0 URINARY FREQUENCY: ICD-10-CM

## 2024-03-25 PROCEDURE — 87086 URINE CULTURE/COLONY COUNT: CPT | Performed by: PHYSICIAN ASSISTANT

## 2024-03-25 PROCEDURE — 51701 INSERT BLADDER CATHETER: CPT | Performed by: PHYSICIAN ASSISTANT

## 2024-03-25 PROCEDURE — 81002 URINALYSIS NONAUTO W/O SCOPE: CPT | Performed by: PHYSICIAN ASSISTANT

## 2024-03-25 NOTE — PROGRESS NOTES
Pt presents for f/u of pelvic organ prolapse, pessary care  Using #3 ring with support, home care    Reports discomfort from pessary past several weeks, feeling of \"pressure on cervix, like when pregnant\"    Reports increased urinary frequency, feeling of incomplete voiding when not using pessary    Denies discharge  Denies bleeding  Bowels reg    She is not currently interested in surgical management of her pelvic organ prolapse --  considering surgery in a few years when kids are older    Reports using vag estrogen after delivery of one of her kids, felt bulge less bothersome then    Menses regular    Previously tried:  #3 incontinence dish (not supportive)  #2 large knob incontinence dish (uncomfortable)    Urogynecology Summary:  Urogynecology Summary  Prolapse: Yes  LEEANN: Yes  Urge Incontinence: No  Nocturia Frequency: 1  Frequency: 1 - 2 hours  Incomplete emptying: Yes  Constipation: No  Wears pad day?: 0  Wears Pad Night?: 0  Activities are limited by UI/POP?: Yes (bulge)  Currently Sexually Active: Yes (pain after intercourse)      Vitals:  Vitals:    03/25/24 1000   Resp: 18   Temp: 98 °F (36.7 °C)       GENERAL EXAM:  GENERAL: alert & oriented, NAD  HEENT: NC/AT, sclera without injection  SKIN: no rashes  LUNGS:  without increased respiratory effort  ABDOMEN: soft, non-tender, non-distended, no masses appreciated  EXT: no edema    PELVIC EXAM:  Ext. Gen: no atrophy, no lesions  Urethra: no atrophy, nontender  Bladder: no fullness, nontender  Vagina: no atrophy  Cervix: no bleeding, some erythema, nontender  Uterus: +mobile  Adnexa: no masses, nontender  Perineum: nontender  Anus: wnl  Rectum: defer     PELVIS FLOOR NEUROMUSCULAR FUNCTION:  Strength:  2 and Unable to hold greater than 3 sec  Perineal Sensation:  Normal        PELVIC SUPPORT:  [place]:  0  Ant:  2  Post:  2  CST:  negative  UVJ: +hypermobile    A #2 cube was placed without difficulty  Pt reported it to be comfortable and supportive    Pt voided  300 mL in the privacy of the bathroom but reports needing to valsalva to void  After obtaining verbal consent from the patient, sterile technique used to prep urethra and collect urine.  PVR 40 mL.  Sent for culture.    Pt unable to remove pessary independently  Removed by PA    A #2 ring with support was placed without difficulty  Pt reported it to be comfortable and supportive but unable to remove    Pt's original #3 ring with support was placed without difficulty  Pt reported it to be comfortable and supportive  Pt confident in her ability to remove & insert independently at home    Impression:    ICD-10-CM    1. Cystocele, midline  N81.11       2. Rectocele  N81.6       3. Female stress incontinence  N39.3       4. Pelvic muscle wasting  N81.84       5. Urinary frequency  R35.0 Straight Cath PVR     POCT urinalysis dipstick[29844]     Urine Culture, Routine      6. Feeling of incomplete bladder emptying  R39.14 Straight Cath PVR          Discussion Items:   Discussed management of pelvic organ prolapse including but not limited to behavioral modifications, conservative options, and surgical management.   Discussed pessary management including benefits and risks. Discussed importance of keeping regularly scheduled pessary checks in prevention of complications related to pessary use.     Discussed vag moisturizers    Treatment Plan:  Follow UCx, treat if +  Continue pessary management, home care, will try removing nightly to minimize discomfort  Vag moisturizers  Encouraged daily pelvic exercises  Cont bowel regimen  Call with s/sx of UTI, problems with pessary     All questions answered  Pt understands and agrees to plan       Return in about 3 months (around 6/25/2024) for pessary check.      Lucia Olson PA-C

## 2024-06-03 ENCOUNTER — OFFICE VISIT (OUTPATIENT)
Dept: UROLOGY | Facility: CLINIC | Age: 39
End: 2024-06-03
Attending: OBSTETRICS & GYNECOLOGY
Payer: COMMERCIAL

## 2024-06-03 VITALS — RESPIRATION RATE: 18 BRPM | BODY MASS INDEX: 25 KG/M2 | HEIGHT: 65 IN

## 2024-06-03 DIAGNOSIS — N81.6 RECTOCELE: ICD-10-CM

## 2024-06-03 DIAGNOSIS — N81.11 CYSTOCELE, MIDLINE: Primary | ICD-10-CM

## 2024-06-03 DIAGNOSIS — N81.84 PELVIC MUSCLE WASTING: ICD-10-CM

## 2024-06-03 DIAGNOSIS — N39.3 FEMALE STRESS INCONTINENCE: ICD-10-CM

## 2024-06-03 PROCEDURE — 99212 OFFICE O/P EST SF 10 MIN: CPT

## 2024-06-03 NOTE — PROGRESS NOTES
Pt presents for f/u of pelvic organ prolapse, pessary care  Using #3 ring with support, home care    Reports pessary is comfortable   Denies discharge  Denies bleeding  Denies s/sx of UTI  Bowels reg  Pt is removing pessary at home daily (uses 3-4x/week when exercising)  Using vag moisturizers PRN    Happy with pessary, feels supported  She is not currently interested in surgical management of her pelvic organ prolapse    Previously tried:  #3 incontinence dish (not supportive)  #2 large knob incontinence dish (uncomfortable)  #2 cube (pt unable to remove)    Urogynecology Summary:  Urogynecology Summary  Prolapse: No  LEEANN: No  Nocturia Frequency: 2 (1-2)  Frequency: 2 hours  Incomplete emptying: No  Constipation: No  Wears pad day?: 0  Wears Pad Night?: 0  Activities are limited by UI/POP?: No  Currently Sexually Active: Yes      Vitals:  Vitals:    06/03/24 1158   Resp: 18       GENERAL EXAM:  GENERAL: alert & oriented, NAD  HEENT: NC/AT, sclera without injection  SKIN: no rashes  LUNGS:  without increased respiratory effort  ABDOMEN: soft, non-tender, non-distended, no masses appreciated  EXT: no edema    PELVIC EXAM: MICHAEL Mars, present for exam as a chaperone  Ext. Gen: no atrophy, no lesions  Urethra: no atrophy, nontender  Bladder: no fullness, nontender  Vagina: no atrophy  Cervix: no bleeding, some erythema, nontender  Uterus: +mobile  Adnexa: no masses, nontender  Perineum: nontender  Anus: wnl  Rectum: defer     PELVIS FLOOR NEUROMUSCULAR FUNCTION:  Strength:  2 and Unable to hold greater than 3 sec  Perineal Sensation:  Normal        PELVIC SUPPORT:  [place]:  0  Ant:  2  Post:  2  CST:  negative  UVJ: +hypermobile    Her pessary was removed, cleaned, and reinserted without difficulty.    Impression:    ICD-10-CM    1. Cystocele, midline  N81.11       2. Rectocele  N81.6       3. Female stress incontinence  N39.3       4. Pelvic muscle wasting  N81.84           Discussion Items:   Discussed management of  pelvic organ prolapse including but not limited to behavioral modifications, conservative options, and surgical management.   Discussed pessary management including benefits and risks. Discussed importance of keeping regularly scheduled pessary checks in prevention of complications related to pessary use.     Treatment Plan:  Continue pessary management, home care  Vag moisturizers prn  Encouraged daily pelvic exercises  Cont bowel regimen  Call with s/sx of UTI, problems with pessary     All questions answered  Pt understands and agrees to plan       Return in about 1 year (around 6/3/2025) for pessary check.      Lucia Olson PA-C    Note to patient: The 21st Century Cures Act makes medical notes like these available to patients in the interest of transparency.  However, be advised this is a medical document.  It is intended as peer to peer communication.  It is written in medical language and may contain abbreviations or verbiage that are unfamiliar.  It may appear blunt or direct.  Medical documents are intended to carry relevant information, facts as evident, and the clinical opinion of the practitioner.

## 2024-07-20 ENCOUNTER — HOSPITAL ENCOUNTER (EMERGENCY)
Facility: HOSPITAL | Age: 39
Discharge: HOME | End: 2024-07-20
Attending: EMERGENCY MEDICINE
Payer: COMMERCIAL

## 2024-07-20 ENCOUNTER — APPOINTMENT (OUTPATIENT)
Dept: CT IMAGING | Facility: HOSPITAL | Age: 39
End: 2024-07-20
Attending: EMERGENCY MEDICINE
Payer: COMMERCIAL

## 2024-07-20 VITALS
WEIGHT: 140 LBS | BODY MASS INDEX: 23 KG/M2 | DIASTOLIC BLOOD PRESSURE: 67 MMHG | OXYGEN SATURATION: 98 % | HEART RATE: 103 BPM | SYSTOLIC BLOOD PRESSURE: 106 MMHG | RESPIRATION RATE: 18 BRPM | TEMPERATURE: 99 F

## 2024-07-20 DIAGNOSIS — S09.8XXA BLUNT HEAD TRAUMA, INITIAL ENCOUNTER: Primary | ICD-10-CM

## 2024-07-20 DIAGNOSIS — S16.1XXA STRAIN OF NECK MUSCLE, INITIAL ENCOUNTER: ICD-10-CM

## 2024-07-20 PROCEDURE — 72125 CT NECK SPINE W/O DYE: CPT | Performed by: EMERGENCY MEDICINE

## 2024-07-20 PROCEDURE — 99284 EMERGENCY DEPT VISIT MOD MDM: CPT

## 2024-07-20 PROCEDURE — 70450 CT HEAD/BRAIN W/O DYE: CPT | Performed by: EMERGENCY MEDICINE

## 2024-07-20 NOTE — ED PROVIDER NOTES
Patient Seen in: [facility] Emergency Department      History     Chief Complaint   Patient presents with    Head Neck Injury    Arm or Hand Injury     Stated Complaint: FALL YESTERDAY WRIST INJ AND HEAD INJ    Subjective:   HPI    Patient is 38-year-old female presents emergency room with a history of hitting her head that occurred this afternoon when she was at a playground.  The patient had her head on the top of her head complains of pain in the head and neck only at this time.  Patient states she feels a little fatigued and stiff today.  Patient does not take any blood thinners.  The patient denies history of any other injury and denies history of any other complaints of discomfort at this time.  Patient denies history of any vomiting  at home.    Objective:   Past Medical History:    Anxiety    Asthma (HCC)    mild, exc induced    Incarcerated gravid uterus (HCC)    Ovarian torsion              Past Surgical History:   Procedure Laterality Date    D & c  2021    Laparoscopy,pelvic,biopsy  2021    Right ovarian torsion          Other Right     ovarian torsion                No pertinent social history.            Review of Systems    Positive for stated Chief Complaint: Head Neck Injury and Arm or Hand Injury    Other systems are as noted in HPI.  Constitutional and vital signs reviewed.      All other systems reviewed and negative except as noted above.    Physical Exam     ED Triage Vitals [24 1724]   /67   Pulse 103   Resp 18   Temp 98.7 °F (37.1 °C)   Temp src    SpO2 98 %   O2 Device        Current Vitals:   Vital Signs  BP: 106/67  Pulse: 103  Resp: 18  Temp: 98.7 °F (37.1 °C)  MAP (mmHg): 80    Oxygen Therapy  SpO2: 98 %            Physical Exam     GENERAL: Well-developed, well-nourished female sitting up breathing easily in no apparent distress.  Patient is nontoxic in appearance.  HEENT: Head is normocephalic, atraumatic. Pupils are 4 mm equally round and reactive to  light. Oropharynx is clear. Mucous membranes are moist.  NECK: There is mild focal tenderness to palpation appreciated to the paraspinal areas of the cervical spine only with no focal midline tenderness to palpation appreciated.   LUNGS: Clear to auscultation bilaterally with no wheeze. There is good equal air entry bilaterally.  HEART: Regular rate and rhythm. Normal S1, S2 no S3, or S4. No murmur.  EXTREMITIES: There is no cyanosis, clubbing, or edema appreciated. Pulses are 2+ and equal in both upper extremities.   NEURO: Patient is awake, alert and oriented to time place and person. Motor strength is 5 over 5 in all 4 extremities. There are no gross motor or sensory deficits appreciated. Cranial nerves II through XII are intact.  Patient answering all questions appropriately.        ED Course   Labs Reviewed - No data to display  I personally reviewed the patient's CT scan of the brain images and my individual interpretation shows no evidence of any acute intracerebral bleed.  I also reviewed the official radiology report which showed results as noted below.        CT SPINE CERVICAL (CPT=72125)    Result Date: 7/20/2024  CONCLUSION:  No evidence of acute displaced fracture or dislocation.    LOCATION:  [facility]   Dictated by (CST): Stromberg, LeRoy, MD on 7/20/2024 at 6:56 PM     Finalized by (CST): Stromberg, LeRoy, MD on 7/20/2024 at 6:59 PM       CT BRAIN OR HEAD (81074)    Result Date: 7/20/2024  CONCLUSION:  No acute intracranial abnormality.    LOCATION:  [facility]   Dictated by (CST): Stromberg, LeRoy, MD on 7/20/2024 at 6:54 PM     Finalized by (CST): Stromberg, LeRoy, MD on 7/20/2024 at 6:56 PM               [facility]      Patient underwent CT scan with results as noted above.  Patient sitting back and breathing easily in no apparent distress.  Differential diagnosis considered myself includes but is not limited to acute skull fracture, acute intracerebral bleed.  Patient is sitting back and breathing easily in no  apparent distress.  The patient will be discharged home at this time with instructions for head injury instructions and instructions take ibuprofen for pain at home and to ice areas of pain at home and return to the ER if any other problems arise.  Patient offered a prescription for muscle relaxants but she is declining at this time.  Instructions to return to the ER immediately if symptoms worsen or if any other problems arise.  Patient discharged home at this time.                                   Medical Decision Making      Disposition and Plan     Clinical Impression:  1. Blunt head trauma, initial encounter    2. Strain of neck muscle, initial encounter         Disposition:  Discharge  7/20/2024  7:03 pm    Follow-up:  Errol Funes MD  [address] [place] [id number] [phone]    Follow up in 2 day(s)            Medications Prescribed:  Current Discharge Medication List

## 2024-07-20 NOTE — ED INITIAL ASSESSMENT (HPI)
Pt to ER following a head injury that occurred yesterday. Pt states she hit her head on a \"metal pole at the playground\". Pt states she feels fatigued and stiff today but was fine after the injury yesterday

## 2024-07-21 NOTE — DISCHARGE INSTRUCTIONS
Rest at home  Motrin for pain at home  Ice to areas of pain at home  Return to the ER if symptoms worsen or if any other problems arise

## 2024-10-15 ENCOUNTER — APPOINTMENT (OUTPATIENT)
Dept: CT IMAGING | Facility: HOSPITAL | Age: 39
End: 2024-10-15
Attending: EMERGENCY MEDICINE
Payer: COMMERCIAL

## 2024-10-15 ENCOUNTER — HOSPITAL ENCOUNTER (EMERGENCY)
Facility: HOSPITAL | Age: 39
Discharge: HOME | End: 2024-10-15
Attending: EMERGENCY MEDICINE
Payer: COMMERCIAL

## 2024-10-15 VITALS
SYSTOLIC BLOOD PRESSURE: 112 MMHG | WEIGHT: 144 LBS | DIASTOLIC BLOOD PRESSURE: 76 MMHG | HEIGHT: 65 IN | HEART RATE: 65 BPM | BODY MASS INDEX: 23.99 KG/M2 | OXYGEN SATURATION: 100 % | TEMPERATURE: 98 F | RESPIRATION RATE: 16 BRPM

## 2024-10-15 DIAGNOSIS — R10.9 ABDOMINAL PAIN OF UNKNOWN ETIOLOGY: Primary | ICD-10-CM

## 2024-10-15 DIAGNOSIS — R91.1 LUNG NODULE: ICD-10-CM

## 2024-10-15 DIAGNOSIS — R19.7 DIARRHEA, UNSPECIFIED TYPE: ICD-10-CM

## 2024-10-15 LAB
ALBUMIN SERPL-MCNC: 5 G/DL (ref 3.2–4.8)
ALBUMIN/GLOB SERPL: 2 {RATIO} (ref 1–2)
ALP LIVER SERPL-CCNC: 71 U/L
ALT SERPL-CCNC: 20 U/L
ANION GAP SERPL CALC-SCNC: 3 MMOL/L (ref 0–18)
AST SERPL-CCNC: 19 U/L (ref ?–34)
B-HCG UR QL: NEGATIVE
BASOPHILS # BLD AUTO: 0.02 X10(3) UL (ref 0–0.2)
BASOPHILS NFR BLD AUTO: 0.4 %
BILIRUB SERPL-MCNC: 0.7 MG/DL (ref 0.3–1.2)
BILIRUB UR QL STRIP.AUTO: NEGATIVE
BUN BLD-MCNC: 12 MG/DL (ref 9–23)
CALCIUM BLD-MCNC: 9.2 MG/DL (ref 8.7–10.4)
CHLORIDE SERPL-SCNC: 106 MMOL/L (ref 98–112)
CLARITY UR REFRACT.AUTO: CLEAR
CO2 SERPL-SCNC: 28 MMOL/L (ref 21–32)
CREAT BLD-MCNC: 0.73 MG/DL
EGFRCR SERPLBLD CKD-EPI 2021: 109 ML/MIN/1.73M2 (ref 60–?)
EOSINOPHIL # BLD AUTO: 0.05 X10(3) UL (ref 0–0.7)
EOSINOPHIL NFR BLD AUTO: 0.9 %
ERYTHROCYTE [DISTWIDTH] IN BLOOD BY AUTOMATED COUNT: 11.5 %
GLOBULIN PLAS-MCNC: 2.5 G/DL (ref 2–3.5)
GLUCOSE BLD-MCNC: 93 MG/DL (ref 70–99)
GLUCOSE UR STRIP.AUTO-MCNC: NORMAL MG/DL
HCT VFR BLD AUTO: 40.7 %
HGB BLD-MCNC: 14.3 G/DL
IMM GRANULOCYTES # BLD AUTO: 0.03 X10(3) UL (ref 0–1)
IMM GRANULOCYTES NFR BLD: 0.5 %
KETONES UR STRIP.AUTO-MCNC: NEGATIVE MG/DL
LEUKOCYTE ESTERASE UR QL STRIP.AUTO: NEGATIVE
LIPASE SERPL-CCNC: 36 U/L (ref 12–53)
LYMPHOCYTES # BLD AUTO: 1.13 X10(3) UL (ref 1–4)
LYMPHOCYTES NFR BLD AUTO: 20.4 %
MCH RBC QN AUTO: 30.3 PG (ref 26–34)
MCHC RBC AUTO-ENTMCNC: 35.1 G/DL (ref 31–37)
MCV RBC AUTO: 86.2 FL
MONOCYTES # BLD AUTO: 0.32 X10(3) UL (ref 0.1–1)
MONOCYTES NFR BLD AUTO: 5.8 %
NEUTROPHILS # BLD AUTO: 3.99 X10 (3) UL (ref 1.5–7.7)
NEUTROPHILS # BLD AUTO: 3.99 X10(3) UL (ref 1.5–7.7)
NEUTROPHILS NFR BLD AUTO: 72 %
NITRITE UR QL STRIP.AUTO: NEGATIVE
OSMOLALITY SERPL CALC.SUM OF ELEC: 283 MOSM/KG (ref 275–295)
PH UR STRIP.AUTO: 5 [PH] (ref 5–8)
PLATELET # BLD AUTO: 224 10(3)UL (ref 150–450)
POTASSIUM SERPL-SCNC: 3.9 MMOL/L (ref 3.5–5.1)
PROT SERPL-MCNC: 7.5 G/DL (ref 5.7–8.2)
PROT UR STRIP.AUTO-MCNC: NEGATIVE MG/DL
RBC # BLD AUTO: 4.72 X10(6)UL
RBC UR QL AUTO: NEGATIVE
SODIUM SERPL-SCNC: 137 MMOL/L (ref 136–145)
SP GR UR STRIP.AUTO: 1.02 (ref 1–1.03)
UROBILINOGEN UR STRIP.AUTO-MCNC: NORMAL MG/DL
WBC # BLD AUTO: 5.5 X10(3) UL (ref 4–11)

## 2024-10-15 PROCEDURE — 85025 COMPLETE CBC W/AUTO DIFF WBC: CPT

## 2024-10-15 PROCEDURE — 74177 CT ABD & PELVIS W/CONTRAST: CPT | Performed by: EMERGENCY MEDICINE

## 2024-10-15 PROCEDURE — 99285 EMERGENCY DEPT VISIT HI MDM: CPT

## 2024-10-15 PROCEDURE — 83690 ASSAY OF LIPASE: CPT

## 2024-10-15 PROCEDURE — 85025 COMPLETE CBC W/AUTO DIFF WBC: CPT | Performed by: EMERGENCY MEDICINE

## 2024-10-15 PROCEDURE — 99284 EMERGENCY DEPT VISIT MOD MDM: CPT

## 2024-10-15 PROCEDURE — 83690 ASSAY OF LIPASE: CPT | Performed by: EMERGENCY MEDICINE

## 2024-10-15 PROCEDURE — 81025 URINE PREGNANCY TEST: CPT

## 2024-10-15 PROCEDURE — 80053 COMPREHEN METABOLIC PANEL: CPT | Performed by: EMERGENCY MEDICINE

## 2024-10-15 PROCEDURE — 80053 COMPREHEN METABOLIC PANEL: CPT

## 2024-10-15 PROCEDURE — 36415 COLL VENOUS BLD VENIPUNCTURE: CPT

## 2024-10-15 PROCEDURE — 81003 URINALYSIS AUTO W/O SCOPE: CPT | Performed by: EMERGENCY MEDICINE

## 2024-10-15 NOTE — ED INITIAL ASSESSMENT (HPI)
Patient with abd pain since Sunday morning. Diarrhea, no blood. No vomiting or fevers. Woke up this AM and had bland food, felt worse after so came in

## 2024-10-15 NOTE — DISCHARGE INSTRUCTIONS
Follow-up for further evaluation with primary physician.  Return if new or worse symptoms.  Rest, plenty fluids.  Tylenol ibuprofen if needed.  Imodium as discussed.

## 2024-10-15 NOTE — ED PROVIDER NOTES
Patient Seen in: [facility] Emergency Department      History     Chief Complaint   Patient presents with    Abdomen/Flank Pain     Stated Complaint: abdominal pain since this am, \"gi issues since \"    Subjective:   HPI      Patient is a 39-year-old female who states on  she did have diarrhea.  Patient had 5 episodes of diarrhea yesterday.  Patient states she has significant left flank pain throughout the day.  Patient states it is moderate.  Patient denies nausea vomiting, no fevers or chills, no diarrhea.  Patient denies any blood in her stool, no recent antibiotics.  Patient denies vaginal discharge vaginal bleeding, no hematuria or dysuria.  Remainder of review of systems negative.    Objective:     Past Medical History:    Anxiety    Asthma (HCC)    mild, exc induced    Incarcerated gravid uterus (HCC)    Ovarian torsion              Past Surgical History:   Procedure Laterality Date    D & c  2021    Laparoscopy,pelvic,biopsy  2021    Right ovarian torsion          Other Right     ovarian torsion                Social History     Socioeconomic History    Marital status:    Tobacco Use    Smoking status: Never    Smokeless tobacco: Never   Vaping Use    Vaping status: Never Used   Substance and Sexual Activity    Alcohol use: Not Currently    Drug use: Not Currently    Sexual activity: Yes     Partners: Male     Social Drivers of Health     Food Insecurity: Low Risk  (2023)    Received from [facility]    Food Insecurity     Have there been times that your food ran out, and you didn't have money to get more?: No     Are there times that you worry that this might happen?: No   Transportation Needs: Low Risk  (2023)    Received from [facility]    Transportation Needs     Do you have trouble getting transportation to medical appointments?: No     How do you  normally get to and from your appointments?: Other    Received from [facility]    Housing Stability                  Physical Exam     ED Triage Vitals [10/15/24 1016]   /76   Pulse 72   Resp 16   Temp 98 °F (36.7 °C)   Temp src Temporal   SpO2 100 %   O2 Device None (Room air)       Current Vitals:   No data recorded      Physical Exam   GENERAL: Patient resting on the cart in no acute distress.  HEENT: Extraocular muscles intact, pupils equal round reactive to light.  Mouth normal, neck supple, no meningismus.  LUNGS: Lungs clear to auscultation bilaterally.  CARDIOVASCULAR: + S1-S2, regular rate and rhythm, no murmurs.  BACK: No CVA tenderness, no midline bony tenderness.  ABDOMEN: + Bowel sounds, soft, mild tenderness left flank left lower quadrant, nondistended.  No rebound, no guarding, no hepatosplenomegaly.  EXTREMITIES: Full range of motion, no tenderness, good capillary refill.  SKIN: No rash, good turgor.  NEURO: Patient answers questions appropriately.  No focal deficits appreciated.        ED Course     Labs Reviewed   COMP METABOLIC PANEL (14) - Abnormal; Notable for the following components:       Result Value    Albumin 5.0 (*)     All other components within normal limits   LIPASE - Normal   POCT PREGNANCY URINE - Normal   CBC WITH DIFFERENTIAL WITH PLATELET   URINALYSIS WITH CULTURE REFLEX            CT abdomen pelvis1. No acute abnormality in the abdomen or pelvis.   2. 5 mm right lower lobe pulmonary nodule.  Follow-up per Fleischner criteria is recommended.   Independent reviewed by myself, no free air       MDM      Patient stable during her stay in the emergency department.  Patient declined pain medicines.  Patient's blood work unremarkable.  CT no acute abnormality.  Patient felt comfortable going home.  Recommend follow-up for reevaluation primary physician.  Call for appointment.  Return if fever, increased pain, new or worse symptoms.  Rest, plenty fluids.  May use  Tylenol or ibuprofen if needed.  Imodium as discussed.  I did consider infectious diarrhea, food poisoning, viral gastroenteritis, diverticulitis        Medical Decision Making      Disposition and Plan     Clinical Impression:  1. Abdominal pain of unknown etiology    2. Diarrhea, unspecified type    3. Lung nodule         Disposition:  Discharge  10/15/2024  1:25 pm    Follow-up:  Errol Funes MD  [facility] [address] [place] [id number] [phone]    Follow up in 2 day(s)            Medications Prescribed:  Discharge Medication List as of 10/15/2024  1:32 PM              Supplementary Documentation:

## 2024-11-19 ENCOUNTER — TELEPHONE (OUTPATIENT)
Dept: UROLOGY | Facility: CLINIC | Age: 39
End: 2024-11-19

## 2024-11-19 NOTE — TELEPHONE ENCOUNTER
Pt calls with request for new pessary, states that her cleaning lady threw her pessary out by mistake.    Per last visit notes, pt self maintaining # 3 ring with support, last visit 6/3/24 with YESSICA Reyes. Planned yearly follow up.  Called pt, LMOR that we can provide a replacement and the pessary will be at the .  If this happens again, pt will need to schedule a pessary visit. Pt to call with questions.

## 2025-06-03 ENCOUNTER — TELEPHONE (OUTPATIENT)
Dept: UROLOGY | Facility: CLINIC | Age: 40
End: 2025-06-03

## 2025-06-09 ENCOUNTER — OFFICE VISIT (OUTPATIENT)
Dept: UROLOGY | Facility: CLINIC | Age: 40
End: 2025-06-09
Attending: OBSTETRICS & GYNECOLOGY
Payer: COMMERCIAL

## 2025-06-09 VITALS — HEIGHT: 65 IN | RESPIRATION RATE: 16 BRPM | TEMPERATURE: 98 F | BODY MASS INDEX: 24 KG/M2

## 2025-06-09 DIAGNOSIS — N81.11 CYSTOCELE, MIDLINE: Primary | ICD-10-CM

## 2025-06-09 DIAGNOSIS — N81.84 PELVIC MUSCLE WASTING: ICD-10-CM

## 2025-06-09 DIAGNOSIS — N81.6 RECTOCELE: ICD-10-CM

## 2025-06-09 PROCEDURE — 99212 OFFICE O/P EST SF 10 MIN: CPT

## 2025-06-09 NOTE — PROGRESS NOTES
Pt presents for f/u of pelvic organ prolapse, pessary care  Using #3 ring with support pessary, home care    Currently in PFPT with Radha Rockwell  Has attended 6 sessions  Reports 70-80% improvement    Reports pessary is comfortable, using for exercise only  Denies discharge  Denies bleeding  Denies s/sx of UTI  Bowels reg    Happy with pessary, feels supported  She is not currently interested in surgical management of her pelvic organ prolapse    Urogynecology Summary:  Urogynecology Summary  Prolapse: Yes  LEEANN: Yes  Urge Incontinence: No  Nocturia Frequency: 1  Frequency: 2 hours  Incomplete emptying: No  Constipation: No  Activities are limited by UI/POP?: No  Currently Sexually Active: Yes      Vitals:  Vitals:    06/09/25 1317   Resp: 16   Temp: 97.9 °F (36.6 °C)       GENERAL EXAM:  GENERAL: alert & oriented, NAD  HEENT: NC/AT, sclera without injection  SKIN: no rashes  LUNGS:  without increased respiratory effort  ABDOMEN: soft, non-tender, non-distended, no masses appreciated  EXT: no edema    PELVIC EXAM: MICHAEL Mars, present for exam as a chaperone  Ext. Gen: no atrophy, no lesions  Urethra: no atrophy, nontender  Bladder: no fullness, nontender  Vagina: no atrophy  Cervix: no bleeding, no lesions, nontender  Uterus: +mobile  Adnexa: no masses, nontender  Perineum: nontender  Anus: wnl  Rectum: defer     PELVIS FLOOR NEUROMUSCULAR FUNCTION:  Strength:  2 and Unable to hold greater than 3 sec  Perineal Sensation:  Normal        PELVIC SUPPORT:  [place]:  0-1  Ant:  2  Post:  2  CST:  negative  UVJ: +hypermobile    Impression:    ICD-10-CM    1. Cystocele, midline  N81.11       2. Rectocele  N81.6       3. Pelvic muscle wasting  N81.84           Discussion Items:   Discussed management of pelvic organ prolapse including but not limited to behavioral modifications, conservative options, and surgical management.   Discussed UDS testing if considering surgery in future.  Discussed pessary management including  benefits and risks. Discussed importance of keeping regularly scheduled pessary checks in prevention of complications related to pessary use.     Treatment Plan:  Continue pessary management, home care  Continue PFPT  Encouraged daily pelvic exercises  Cont bowel regimen  Call with s/sx of UTI, problems with pessary     All questions answered  Pt understands and agrees to plan       Return in about 1 year (around 6/9/2026) for pessary check.      Lucia Olson PA-C    Note to patient: The 21st Century Cures Act makes medical notes like these available to patients in the interest of transparency.  However, be advised this is a medical document.  It is intended as peer to peer communication.  It is written in medical language and may contain abbreviations or verbiage that are unfamiliar.  It may appear blunt or direct.  Medical documents are intended to carry relevant information, facts as evident, and the clinical opinion of the practitioner.